# Patient Record
Sex: FEMALE | Race: WHITE | Employment: FULL TIME | URBAN - NONMETROPOLITAN AREA
[De-identification: names, ages, dates, MRNs, and addresses within clinical notes are randomized per-mention and may not be internally consistent; named-entity substitution may affect disease eponyms.]

---

## 2018-01-29 ENCOUNTER — OFFICE VISIT (OUTPATIENT)
Dept: FAMILY MEDICINE CLINIC | Age: 33
End: 2018-01-29
Payer: COMMERCIAL

## 2018-01-29 VITALS
TEMPERATURE: 98 F | HEART RATE: 77 BPM | SYSTOLIC BLOOD PRESSURE: 124 MMHG | RESPIRATION RATE: 20 BRPM | DIASTOLIC BLOOD PRESSURE: 82 MMHG | WEIGHT: 230 LBS | BODY MASS INDEX: 37.12 KG/M2 | OXYGEN SATURATION: 98 %

## 2018-01-29 DIAGNOSIS — J03.90 TONSILLITIS: Primary | ICD-10-CM

## 2018-01-29 DIAGNOSIS — J02.9 SORE THROAT: ICD-10-CM

## 2018-01-29 LAB — S PYO AG THROAT QL: NEGATIVE

## 2018-01-29 PROCEDURE — 99213 OFFICE O/P EST LOW 20 MIN: CPT | Performed by: NURSE PRACTITIONER

## 2018-01-29 RX ORDER — AZITHROMYCIN 250 MG/1
TABLET, FILM COATED ORAL
Qty: 1 PACKET | Refills: 0 | Status: SHIPPED | OUTPATIENT
Start: 2018-01-29

## 2018-01-29 ASSESSMENT — ENCOUNTER SYMPTOMS: SORE THROAT: 1

## 2018-02-01 LAB
ORGANISM: ABNORMAL
S PYO THROAT QL CULT: ABNORMAL
S PYO THROAT QL CULT: ABNORMAL

## 2019-06-15 ENCOUNTER — OFFICE VISIT (OUTPATIENT)
Dept: URGENT CARE | Age: 34
End: 2019-06-15
Payer: COMMERCIAL

## 2019-06-15 VITALS
OXYGEN SATURATION: 97 % | TEMPERATURE: 99.6 F | HEIGHT: 65 IN | DIASTOLIC BLOOD PRESSURE: 84 MMHG | BODY MASS INDEX: 36.74 KG/M2 | SYSTOLIC BLOOD PRESSURE: 120 MMHG | HEART RATE: 93 BPM | WEIGHT: 220.5 LBS | RESPIRATION RATE: 16 BRPM

## 2019-06-15 DIAGNOSIS — R30.0 DYSURIA: ICD-10-CM

## 2019-06-15 DIAGNOSIS — N30.90 CYSTITIS: Primary | ICD-10-CM

## 2019-06-15 PROCEDURE — 99202 OFFICE O/P NEW SF 15 MIN: CPT | Performed by: FAMILY MEDICINE

## 2019-06-15 RX ORDER — CEFDINIR 300 MG/1
300 CAPSULE ORAL 2 TIMES DAILY
Qty: 14 CAPSULE | Refills: 0 | Status: SHIPPED | OUTPATIENT
Start: 2019-06-15 | End: 2019-06-22

## 2019-06-15 NOTE — PATIENT INSTRUCTIONS
Drink lots of water and take the antibiotic until completed. Return for problems. We will call if the culture shows that you need a different antibiotic.

## 2019-06-15 NOTE — PROGRESS NOTES
[address]  [address] [place] [id number]  Dept: [phone]  Loc: [phone]     Ahmed Osler is a 27 y.o. female who presents today for her medical conditions/complaintsas noted below. Ahmed Osler is c/o of Dysuria (completed Amoxicillin 3 weeks ago, now with buring urination)        HPI:     Urinary Tract Infection    The current episode started in the past 7 days. The problem has been gradually worsening. The quality of the pain is described as burning and shooting. Associated symptoms include frequency, hesitancy and urgency. Pertinent negatives include no chills, discharge, flank pain or hematuria. There is no history of catheterization, recurrent UTIs, urinary stasis or a urological procedure. No past medical history on file. Past Surgical History:   Procedure Laterality Date     SECTION      patient has had 2        Family History   Problem Relation Age of Onset    Diabetes Mother         diagnosed with Type 1 but changed lifestyle and doig fine    Diabetes Maternal Grandmother     Heart Disease Maternal Grandmother     Heart Disease Maternal Grandfather        Social History     Tobacco Use    Smoking status: Never Smoker    Smokeless tobacco: Never Used   Substance Use Topics    Alcohol use: No      Current Outpatient Medications   Medication Sig Dispense Refill    cefdinir (OMNICEF) 300 MG capsule Take 1 capsule by mouth 2 times daily for 7 days 14 capsule 0    azithromycin (ZITHROMAX Z-CATHY) 250 MG tablet Take as directed 1 packet 0     No current facility-administered medications for this visit.       Allergies   Allergen Reactions    Amoxicillin      Thrush/yeast infection         Health Maintenance   Topic Date Due    Varicella Vaccine (1 of 2 - 13+ 2-dose series) 1997    HIV screen  1999    DTaP/Tdap/Td vaccine (1 - Tdap) 2003    Cervical cancer screen  2005    Flu vaccine (Season Ended) 09/01/2019    Pneumococcal 0-64 years Vaccine  Aged Out       Subjective:     Review of Systems   Constitutional: Negative for chills. Genitourinary: Positive for frequency, hesitancy and urgency. Negative for flank pain and hematuria. Objective:      Physical Exam   Constitutional: She is oriented to person, place, and time. She appears well-developed and well-nourished. No distress. HENT:   Head: Normocephalic and atraumatic. Eyes: Pupils are equal, round, and reactive to light. EOM are normal.   Cardiovascular: Normal rate and regular rhythm. Pulmonary/Chest: Effort normal and breath sounds normal.   Neurological: She is alert and oriented to person, place, and time. Skin: She is not diaphoretic. Nursing note and vitals reviewed. /84   Pulse 93   Temp 99.6 °F (37.6 °C) (Temporal)   Resp 16   Ht 5' 5\" (1.651 m)   Wt 220 lb 8 oz (100 kg)   SpO2 97%   BMI 36.69 kg/m²     Assessment:          Diagnosis Orders   1. Cystitis  cefdinir (OMNICEF) 300 MG capsule   2. Dysuria  Urine culture       Plan:      Orders Placed This Encounter   Procedures    Urine culture     Order Specific Question:   Specify (ex-cath, midstream, cysto, etc)? Answer:   midstream        No follow-ups on file. Orders Placed This Encounter   Procedures    Urine culture     Order Specific Question:   Specify (ex-cath, midstream, cysto, etc)? Answer:   midstream     Orders Placed This Encounter   Medications    cefdinir (OMNICEF) 300 MG capsule     Sig: Take 1 capsule by mouth 2 times daily for 7 days     Dispense:  14 capsule     Refill:  0       Patient given educationalmaterials - see patient instructions. Discussed use, benefit, and side effectsof prescribed medications. All patient questions answered. Pt voiced understanding. Reviewed health maintenance. Instructed to continue current medications, diet andexercise. Patient agreed with treatment plan.  Follow up as directed. Patient Instructions   Drink lots of water and take the antibiotic until completed. Return for problems. We will call if the culture shows that you need a different antibiotic.         Electronically signed by Taras Gallegos MD on 6/15/2019 at 1:34 PM

## 2019-06-17 LAB
ORGANISM: ABNORMAL
URINE CULTURE, ROUTINE: ABNORMAL
URINE CULTURE, ROUTINE: ABNORMAL

## 2019-06-19 ENCOUNTER — TELEPHONE (OUTPATIENT)
Dept: URGENT CARE | Age: 34
End: 2019-06-19

## 2019-06-19 NOTE — TELEPHONE ENCOUNTER
Please inform patient that her urine culture grew e coli bacteria. She was started on omnicef which is sensitive, there is no further treatment necessary. Please have patient finish full course of antibiotics and follow up with PCP as needed.  Thanks

## 2019-08-15 ENCOUNTER — OFFICE VISIT (OUTPATIENT)
Dept: URGENT CARE | Age: 34
End: 2019-08-15

## 2019-08-15 ENCOUNTER — HOSPITAL ENCOUNTER (EMERGENCY)
Age: 34
Discharge: LEFT BEFORE DISCHARGE | End: 2019-08-15

## 2019-08-15 VITALS
RESPIRATION RATE: 19 BRPM | HEART RATE: 109 BPM | DIASTOLIC BLOOD PRESSURE: 85 MMHG | SYSTOLIC BLOOD PRESSURE: 128 MMHG | OXYGEN SATURATION: 97 % | WEIGHT: 212 LBS | BODY MASS INDEX: 34.07 KG/M2 | TEMPERATURE: 102.3 F | HEIGHT: 66 IN

## 2019-08-15 VITALS
SYSTOLIC BLOOD PRESSURE: 122 MMHG | TEMPERATURE: 100.8 F | BODY MASS INDEX: 34.07 KG/M2 | RESPIRATION RATE: 16 BRPM | OXYGEN SATURATION: 99 % | WEIGHT: 212 LBS | HEART RATE: 118 BPM | HEIGHT: 66 IN | DIASTOLIC BLOOD PRESSURE: 80 MMHG

## 2019-08-15 DIAGNOSIS — R30.0 DYSURIA: Primary | ICD-10-CM

## 2019-08-15 DIAGNOSIS — R10.30 LOWER ABDOMINAL PAIN: ICD-10-CM

## 2019-08-15 DIAGNOSIS — R50.9 FEVER, UNSPECIFIED FEVER CAUSE: ICD-10-CM

## 2019-08-15 LAB
APPEARANCE FLUID: CLEAR
BILIRUBIN, POC: ABNORMAL
BLOOD URINE, POC: ABNORMAL
CLARITY, POC: CLEAR
COLOR, POC: YELLOW
GLUCOSE URINE, POC: ABNORMAL
INFLUENZA A ANTIBODY: NEGATIVE
INFLUENZA B ANTIBODY: NEGATIVE
KETONES, POC: ABNORMAL
LEUKOCYTE EST, POC: ABNORMAL
NITRITE, POC: ABNORMAL
PH, POC: 6
PROTEIN, POC: ABNORMAL
SPECIFIC GRAVITY, POC: 1.02
UROBILINOGEN, POC: 1

## 2019-08-15 PROCEDURE — 4500000002 HC ER NO CHARGE

## 2019-08-15 PROCEDURE — 87804 INFLUENZA ASSAY W/OPTIC: CPT | Performed by: NURSE PRACTITIONER

## 2019-08-15 PROCEDURE — 81002 URINALYSIS NONAUTO W/O SCOPE: CPT | Performed by: NURSE PRACTITIONER

## 2019-08-15 RX ORDER — IBUPROFEN 200 MG
200 TABLET ORAL EVERY 6 HOURS PRN
COMMUNITY

## 2019-08-15 ASSESSMENT — ENCOUNTER SYMPTOMS
EYES NEGATIVE: 1
SORE THROAT: 0
COUGH: 0
SINUS PAIN: 0
ABDOMINAL PAIN: 1
DIARRHEA: 0
BACK PAIN: 1
SHORTNESS OF BREATH: 0
VOMITING: 0
SINUS PRESSURE: 0
NAUSEA: 1
ALLERGIC/IMMUNOLOGIC NEGATIVE: 1

## 2019-08-15 ASSESSMENT — PAIN SCALES - GENERAL: PAINLEVEL_OUTOF10: 8

## 2019-08-15 ASSESSMENT — PAIN DESCRIPTION - LOCATION: LOCATION: GENERALIZED

## 2020-05-20 ENCOUNTER — INITIAL PRENATAL (OUTPATIENT)
Dept: OBSTETRICS AND GYNECOLOGY | Facility: CLINIC | Age: 35
End: 2020-05-20

## 2020-05-20 VITALS
SYSTOLIC BLOOD PRESSURE: 136 MMHG | HEIGHT: 65 IN | WEIGHT: 248 LBS | DIASTOLIC BLOOD PRESSURE: 74 MMHG | BODY MASS INDEX: 41.32 KG/M2

## 2020-05-20 DIAGNOSIS — Z3A.08 8 WEEKS GESTATION OF PREGNANCY: Primary | ICD-10-CM

## 2020-05-20 PROCEDURE — 99202 OFFICE O/P NEW SF 15 MIN: CPT | Performed by: OBSTETRICS & GYNECOLOGY

## 2020-05-20 RX ORDER — PROMETHAZINE HYDROCHLORIDE 12.5 MG/1
12.5 TABLET ORAL EVERY 6 HOURS PRN
Qty: 30 TABLET | Refills: 1 | Status: SHIPPED | OUTPATIENT
Start: 2020-05-20 | End: 2020-06-16

## 2020-05-20 NOTE — PROGRESS NOTES
at 8w5d presents for initial prenatal care visit. Planned pregnancy  OBHx: CSX2  GynHx: no history of abnormal pap smears   PMH: denies   PSh: CSX2  NKDA   PNV   SH: denies drinking, smoking or drug use   PE see above   A/P  at 8w5d IUP   New ob information discussed   RTC in 4 weeks   Miscarriage precautions discussed  Discussed panorama.   No previous blood pressure issues outside of pregnancy   PAP smear next visit

## 2020-05-23 LAB
ABO GROUP BLD: NORMAL
BACTERIA UR CULT: ABNORMAL
BACTERIA UR CULT: ABNORMAL
BASOPHILS # BLD AUTO: 0 X10E3/UL (ref 0–0.2)
BASOPHILS NFR BLD AUTO: 0 %
BLD GP AB SCN SERPL QL: NEGATIVE
C TRACH RRNA SPEC QL NAA+PROBE: NEGATIVE
DRUGS UR: NORMAL
EOSINOPHIL # BLD AUTO: 0.1 X10E3/UL (ref 0–0.4)
EOSINOPHIL NFR BLD AUTO: 1 %
ERYTHROCYTE [DISTWIDTH] IN BLOOD BY AUTOMATED COUNT: 13.5 % (ref 11.7–15.4)
HBV SURFACE AG SERPL QL IA: NEGATIVE
HCT VFR BLD AUTO: 41.1 % (ref 34–46.6)
HGB BLD-MCNC: 13.1 G/DL (ref 11.1–15.9)
HIV 1+2 AB+HIV1 P24 AG SERPL QL IA: NON REACTIVE
IMM GRANULOCYTES # BLD AUTO: 0 X10E3/UL (ref 0–0.1)
IMM GRANULOCYTES NFR BLD AUTO: 0 %
LYMPHOCYTES # BLD AUTO: 1.8 X10E3/UL (ref 0.7–3.1)
LYMPHOCYTES NFR BLD AUTO: 16 %
MCH RBC QN AUTO: 29.4 PG (ref 26.6–33)
MCHC RBC AUTO-ENTMCNC: 31.9 G/DL (ref 31.5–35.7)
MCV RBC AUTO: 92 FL (ref 79–97)
MONOCYTES # BLD AUTO: 1 X10E3/UL (ref 0.1–0.9)
MONOCYTES NFR BLD AUTO: 9 %
N GONORRHOEA RRNA SPEC QL NAA+PROBE: NEGATIVE
NEUTROPHILS # BLD AUTO: 8.6 X10E3/UL (ref 1.4–7)
NEUTROPHILS NFR BLD AUTO: 74 %
OTHER ANTIBIOTIC SUSC ISLT: ABNORMAL
PLATELET # BLD AUTO: 333 X10E3/UL (ref 150–450)
RBC # BLD AUTO: 4.46 X10E6/UL (ref 3.77–5.28)
RH BLD: POSITIVE
RPR SER QL: NON REACTIVE
RUBV IGG SERPL IA-ACNC: 1.4 INDEX
WBC # BLD AUTO: 11.6 X10E3/UL (ref 3.4–10.8)

## 2020-05-26 ENCOUNTER — TELEPHONE (OUTPATIENT)
Dept: OBSTETRICS AND GYNECOLOGY | Facility: CLINIC | Age: 35
End: 2020-05-26

## 2020-05-26 RX ORDER — CEPHALEXIN 500 MG/1
500 CAPSULE ORAL 2 TIMES DAILY
Qty: 10 CAPSULE | Refills: 0 | Status: SHIPPED | OUTPATIENT
Start: 2020-05-26 | End: 2020-05-31

## 2020-05-26 NOTE — TELEPHONE ENCOUNTER
----- Message from Cindy Camacho DO sent at 5/26/2020  9:18 AM CDT -----  Please let the patient know that her urine culture showed e coli. Please send the patient Keflex 500 mg bid for five days

## 2020-05-28 ENCOUNTER — TELEPHONE (OUTPATIENT)
Dept: OBSTETRICS AND GYNECOLOGY | Facility: CLINIC | Age: 35
End: 2020-05-28

## 2020-05-28 NOTE — TELEPHONE ENCOUNTER
Patient can try to take monistat at this time. If she feels like she is having trouble with the antibiotic she should stop it. We are not sending medication to the pharmacy any longer without being evaluated.

## 2020-05-28 NOTE — TELEPHONE ENCOUNTER
Pt calls - she is taking Keflex for e-coli in urine.  She states she has developed raised sores in her mouth since being on Keflex;, having difficulty eating, and also vaginal itching, irritation.  Requesting medication.  Please advise.  Thank you

## 2020-06-16 ENCOUNTER — ROUTINE PRENATAL (OUTPATIENT)
Dept: OBSTETRICS AND GYNECOLOGY | Facility: CLINIC | Age: 35
End: 2020-06-16

## 2020-06-16 VITALS — SYSTOLIC BLOOD PRESSURE: 134 MMHG | WEIGHT: 252 LBS | BODY MASS INDEX: 41.93 KG/M2 | DIASTOLIC BLOOD PRESSURE: 74 MMHG

## 2020-06-16 DIAGNOSIS — Z3A.12 12 WEEKS GESTATION OF PREGNANCY: Primary | ICD-10-CM

## 2020-06-16 DIAGNOSIS — Z12.4 ENCOUNTER FOR SCREENING FOR CERVICAL CANCER: ICD-10-CM

## 2020-06-16 LAB
GLUCOSE UR STRIP-MCNC: NEGATIVE MG/DL
PROT UR STRIP-MCNC: NEGATIVE MG/DL

## 2020-06-16 PROCEDURE — G0123 SCREEN CERV/VAG THIN LAYER: HCPCS | Performed by: OBSTETRICS & GYNECOLOGY

## 2020-06-16 PROCEDURE — 87624 HPV HI-RISK TYP POOLED RSLT: CPT | Performed by: OBSTETRICS & GYNECOLOGY

## 2020-06-16 PROCEDURE — 99212 OFFICE O/P EST SF 10 MIN: CPT | Performed by: OBSTETRICS & GYNECOLOGY

## 2020-06-16 RX ORDER — PROMETHAZINE HYDROCHLORIDE 25 MG/1
25 TABLET ORAL EVERY 6 HOURS PRN
Qty: 30 TABLET | Refills: 2 | Status: SHIPPED | OUTPATIENT
Start: 2020-06-16 | End: 2020-09-21 | Stop reason: SDUPTHER

## 2020-06-16 NOTE — PROGRESS NOTES
at 12.4 IUP presents for follow up prenatal care visit. No obstetrical complaints. Reports the phenergan is helping with her nausea.   PE see above   A/P  at 12.4 IUP   PAP smear collected today. [place] was 5 years ago   RTC in 4 weeks   Panorama today with carrier screening   Miscarriage precautions

## 2020-06-19 LAB
GEN CATEG CVX/VAG CYTO-IMP: NORMAL
HPV I/H RISK 4 DNA CVX QL PROBE+SIG AMP: NOT DETECTED
LAB AP CASE REPORT: NORMAL
LAB AP GYN ADDITIONAL INFORMATION: NORMAL
PATH INTERP SPEC-IMP: NORMAL
STAT OF ADQ CVX/VAG CYTO-IMP: NORMAL

## 2020-07-14 ENCOUNTER — ROUTINE PRENATAL (OUTPATIENT)
Dept: OBSTETRICS AND GYNECOLOGY | Facility: CLINIC | Age: 35
End: 2020-07-14

## 2020-07-14 VITALS — BODY MASS INDEX: 42.6 KG/M2 | WEIGHT: 256 LBS | DIASTOLIC BLOOD PRESSURE: 80 MMHG | SYSTOLIC BLOOD PRESSURE: 126 MMHG

## 2020-07-14 DIAGNOSIS — Z3A.16 16 WEEKS GESTATION OF PREGNANCY: Primary | ICD-10-CM

## 2020-07-14 LAB
GLUCOSE UR STRIP-MCNC: NEGATIVE MG/DL
PROT UR STRIP-MCNC: NEGATIVE MG/DL

## 2020-07-14 PROCEDURE — 99212 OFFICE O/P EST SF 10 MIN: CPT | Performed by: OBSTETRICS & GYNECOLOGY

## 2020-07-14 NOTE — PROGRESS NOTES
at 16.4 IUP presents for follow up prenatal care visit. No obstetrical complaints. Was treated for a UTI by her PCP.   PE see above   A/  at 16.4 IUP   RTC in 4 weeks   Miscarriage precautions discussed   Will resend urine today.

## 2020-07-16 LAB
BACTERIA UR CULT: NORMAL
BACTERIA UR CULT: NORMAL

## 2020-08-12 ENCOUNTER — ROUTINE PRENATAL (OUTPATIENT)
Dept: OBSTETRICS AND GYNECOLOGY | Facility: CLINIC | Age: 35
End: 2020-08-12

## 2020-08-12 VITALS — BODY MASS INDEX: 42.93 KG/M2 | DIASTOLIC BLOOD PRESSURE: 70 MMHG | WEIGHT: 258 LBS | SYSTOLIC BLOOD PRESSURE: 128 MMHG

## 2020-08-12 DIAGNOSIS — Z3A.20 20 WEEKS GESTATION OF PREGNANCY: Primary | ICD-10-CM

## 2020-08-12 LAB
GLUCOSE UR STRIP-MCNC: NEGATIVE MG/DL
PROT UR STRIP-MCNC: NEGATIVE MG/DL

## 2020-08-12 PROCEDURE — 99212 OFFICE O/P EST SF 10 MIN: CPT | Performed by: OBSTETRICS & GYNECOLOGY

## 2020-08-12 NOTE — PROGRESS NOTES
at 20.5 IUP presents for follow up prenatal care visit. No obstetrical complaints.   PE see above   A/P  at 20.5 IUP   RTC In 4 weeks   Desires sterilization Will sign papers around 31 weeks   Miscarriage precautions discussed.

## 2020-09-21 ENCOUNTER — ROUTINE PRENATAL (OUTPATIENT)
Dept: OBSTETRICS AND GYNECOLOGY | Facility: CLINIC | Age: 35
End: 2020-09-21

## 2020-09-21 VITALS — BODY MASS INDEX: 44.43 KG/M2 | WEIGHT: 267 LBS | DIASTOLIC BLOOD PRESSURE: 76 MMHG | SYSTOLIC BLOOD PRESSURE: 110 MMHG

## 2020-09-21 DIAGNOSIS — Z3A.26 26 WEEKS GESTATION OF PREGNANCY: Primary | ICD-10-CM

## 2020-09-21 LAB
GLUCOSE UR STRIP-MCNC: NEGATIVE MG/DL
PROT UR STRIP-MCNC: NEGATIVE MG/DL

## 2020-09-21 PROCEDURE — 99212 OFFICE O/P EST SF 10 MIN: CPT | Performed by: OBSTETRICS & GYNECOLOGY

## 2020-09-21 RX ORDER — PROMETHAZINE HYDROCHLORIDE 25 MG/1
TABLET ORAL
COMMUNITY
Start: 2020-06-16 | End: 2020-10-20

## 2020-09-21 RX ORDER — ASCORBIC ACID, CHOLECALCIFEROL, .ALPHA.-TOCOPHEROL ACETATE, DL-, PYRIDOXINE, FOLIC ACID, CYANOCOBALAMIN, CALCIUM, FERROUS FUMARATE, MAGNESIUM, DOCONEXENT 85; 200; 10; 25; 1; 12; 140; 27; 45; 300 [IU]/1; [IU]/1; [IU]/1; [IU]/1; MG/1; UG/1; MG/1; MG/1; MG/1; MG/1
CAPSULE, GELATIN COATED ORAL
COMMUNITY
End: 2021-01-05

## 2020-09-21 NOTE — PROGRESS NOTES
at 26.3 IUP presents for follow up. No obstetrical complaints.   PE see above   A/P  at 26.3 IUP   RTC in 2 weeks   GCT and hemoglobin at that time   Declined flu shot.   Desires sterilization at time of section.

## 2020-10-06 ENCOUNTER — ROUTINE PRENATAL (OUTPATIENT)
Dept: OBSTETRICS AND GYNECOLOGY | Facility: CLINIC | Age: 35
End: 2020-10-06

## 2020-10-06 VITALS — DIASTOLIC BLOOD PRESSURE: 72 MMHG | SYSTOLIC BLOOD PRESSURE: 128 MMHG | BODY MASS INDEX: 44.93 KG/M2 | WEIGHT: 270 LBS

## 2020-10-06 DIAGNOSIS — Z3A.28 28 WEEKS GESTATION OF PREGNANCY: Primary | ICD-10-CM

## 2020-10-06 LAB
GLUCOSE UR STRIP-MCNC: NEGATIVE MG/DL
PROT UR STRIP-MCNC: NEGATIVE MG/DL

## 2020-10-06 PROCEDURE — 99212 OFFICE O/P EST SF 10 MIN: CPT | Performed by: OBSTETRICS & GYNECOLOGY

## 2020-10-06 NOTE — PROGRESS NOTES
at 28.4 IUP Presents for follow up prenatal care visit. No complaints.   PE see above   A/P  at 28.4 IUP   Desires sterilization. Will sign at next visit   Schedule section for 2020   PTL precautions   GCT and hemoglobin   Declined TDap and flu shot.

## 2020-10-07 LAB
GLUCOSE 1H P 50 G GLC PO SERPL-MCNC: 88 MG/DL (ref 65–139)
HGB BLD-MCNC: 10.8 G/DL (ref 11.1–15.9)

## 2020-10-20 ENCOUNTER — ROUTINE PRENATAL (OUTPATIENT)
Dept: OBSTETRICS AND GYNECOLOGY | Facility: CLINIC | Age: 35
End: 2020-10-20

## 2020-10-20 VITALS — BODY MASS INDEX: 45.26 KG/M2 | WEIGHT: 272 LBS | SYSTOLIC BLOOD PRESSURE: 126 MMHG | DIASTOLIC BLOOD PRESSURE: 84 MMHG

## 2020-10-20 DIAGNOSIS — O34.219 PREVIOUS CESAREAN DELIVERY, ANTEPARTUM: Primary | ICD-10-CM

## 2020-10-20 LAB
GLUCOSE UR STRIP-MCNC: NEGATIVE MG/DL
PROT UR STRIP-MCNC: NEGATIVE MG/DL

## 2020-10-20 PROCEDURE — 99213 OFFICE O/P EST LOW 20 MIN: CPT | Performed by: OBSTETRICS & GYNECOLOGY

## 2020-11-03 ENCOUNTER — ROUTINE PRENATAL (OUTPATIENT)
Dept: OBSTETRICS AND GYNECOLOGY | Facility: CLINIC | Age: 35
End: 2020-11-03

## 2020-11-03 VITALS — DIASTOLIC BLOOD PRESSURE: 76 MMHG | SYSTOLIC BLOOD PRESSURE: 120 MMHG | WEIGHT: 274 LBS | BODY MASS INDEX: 45.6 KG/M2

## 2020-11-03 DIAGNOSIS — Z98.891 S/P CESAREAN SECTION: ICD-10-CM

## 2020-11-03 DIAGNOSIS — Z30.2 ENCOUNTER FOR STERILIZATION: Primary | ICD-10-CM

## 2020-11-03 LAB
GLUCOSE UR STRIP-MCNC: NEGATIVE MG/DL
PROT UR STRIP-MCNC: NEGATIVE MG/DL

## 2020-11-03 PROCEDURE — 99212 OFFICE O/P EST SF 10 MIN: CPT | Performed by: OBSTETRICS & GYNECOLOGY

## 2020-11-03 NOTE — PROGRESS NOTES
at 32.4 IUP Presents for follow up prenatal care visit. NO obstetrical complaints.   PE see above   A/P  at 32.4 IUP   RTC In 2 weeks   Signed papers for tubal ligation   Scheduled repeat  section with salpingectomy, Discussed risk, benefits at this time.   PTL precautions discussed.

## 2020-11-17 ENCOUNTER — ROUTINE PRENATAL (OUTPATIENT)
Dept: OBSTETRICS AND GYNECOLOGY | Facility: CLINIC | Age: 35
End: 2020-11-17

## 2020-11-17 VITALS — SYSTOLIC BLOOD PRESSURE: 124 MMHG | DIASTOLIC BLOOD PRESSURE: 78 MMHG | BODY MASS INDEX: 45.6 KG/M2 | WEIGHT: 274 LBS

## 2020-11-17 DIAGNOSIS — Z98.891 S/P CESAREAN SECTION: Primary | ICD-10-CM

## 2020-11-17 DIAGNOSIS — Z3A.34 34 WEEKS GESTATION OF PREGNANCY: ICD-10-CM

## 2020-11-17 LAB
GLUCOSE UR STRIP-MCNC: NEGATIVE MG/DL
PROT UR STRIP-MCNC: ABNORMAL MG/DL

## 2020-11-17 PROCEDURE — 99212 OFFICE O/P EST SF 10 MIN: CPT | Performed by: OBSTETRICS & GYNECOLOGY

## 2020-11-17 NOTE — PROGRESS NOTES
at 34.4 IUP presents for follow up prenatal care visit. No obstetrical complaints.   PE see above   A/P  at 34.4 IUP   RTC in 2 weeks   PTL precautions   Cultures next visit

## 2020-12-01 ENCOUNTER — ROUTINE PRENATAL (OUTPATIENT)
Dept: OBSTETRICS AND GYNECOLOGY | Facility: CLINIC | Age: 35
End: 2020-12-01

## 2020-12-01 VITALS — WEIGHT: 281 LBS | DIASTOLIC BLOOD PRESSURE: 80 MMHG | SYSTOLIC BLOOD PRESSURE: 120 MMHG | BODY MASS INDEX: 46.76 KG/M2

## 2020-12-01 DIAGNOSIS — Z3A.36 36 WEEKS GESTATION OF PREGNANCY: Primary | ICD-10-CM

## 2020-12-01 LAB
GLUCOSE UR STRIP-MCNC: NEGATIVE MG/DL
PROT UR STRIP-MCNC: NEGATIVE MG/DL

## 2020-12-01 PROCEDURE — 99212 OFFICE O/P EST SF 10 MIN: CPT | Performed by: OBSTETRICS & GYNECOLOGY

## 2020-12-01 NOTE — PROGRESS NOTES
at 36.4 IUP presents for follow up prenatal care visit. No obstetrical complaints.   PE see above   A/p  at 36.4 IUP   RTC in 1 week   PTL precautions   Cultures at next visit

## 2020-12-07 ENCOUNTER — ROUTINE PRENATAL (OUTPATIENT)
Dept: OBSTETRICS AND GYNECOLOGY | Facility: CLINIC | Age: 35
End: 2020-12-07

## 2020-12-07 ENCOUNTER — HOSPITAL ENCOUNTER (OUTPATIENT)
Facility: HOSPITAL | Age: 35
Discharge: HOME | End: 2020-12-07
Attending: OBSTETRICS & GYNECOLOGY | Admitting: OBSTETRICS & GYNECOLOGY

## 2020-12-07 VITALS
SYSTOLIC BLOOD PRESSURE: 138 MMHG | BODY MASS INDEX: 47.65 KG/M2 | DIASTOLIC BLOOD PRESSURE: 80 MMHG | HEIGHT: 65 IN | WEIGHT: 286 LBS

## 2020-12-07 VITALS — DIASTOLIC BLOOD PRESSURE: 80 MMHG | WEIGHT: 284 LBS | BODY MASS INDEX: 47.26 KG/M2 | SYSTOLIC BLOOD PRESSURE: 136 MMHG

## 2020-12-07 DIAGNOSIS — Z98.891 S/P CESAREAN SECTION: Primary | ICD-10-CM

## 2020-12-07 DIAGNOSIS — Z3A.37 37 WEEKS GESTATION OF PREGNANCY: ICD-10-CM

## 2020-12-07 LAB
GLUCOSE UR STRIP-MCNC: NEGATIVE MG/DL
PROT UR STRIP-MCNC: NEGATIVE MG/DL

## 2020-12-07 PROCEDURE — 99212 OFFICE O/P EST SF 10 MIN: CPT | Performed by: OBSTETRICS & GYNECOLOGY

## 2020-12-07 PROCEDURE — G0463 HOSPITAL OUTPT CLINIC VISIT: HCPCS

## 2020-12-07 PROCEDURE — G0378 HOSPITAL OBSERVATION PER HR: HCPCS

## 2020-12-07 NOTE — NURSING NOTE
Patient presents to labor and delivery with c/o contractions that started this morning. Patient reports good fetal movement, no bleeding or leaking of fluid.

## 2020-12-07 NOTE — PROGRESS NOTES
at 37.3 IUP presents for follow up prenatal care visit. Reports that she has had contractions that started yesterday and have progressively gotten closer together. She denies VB or LOF. Reports + FM.   PE   SVE: 1 cm   A/P  at 37.3 IUP   Patient sent to labor and delivery for monitoring

## 2020-12-08 PROBLEM — Z34.90 PREGNANCY: Status: ACTIVE | Noted: 2020-12-08

## 2020-12-08 NOTE — H&P
Patient is sent from my office to rule out labor.  She was found to be 1 cm in the office.  She has been monitored for several hours on labor and delivery.  She is having irregular contractions.  Cervix is unchanged at fingertip.  Category 1 NST with irregular contractions noted on the monitor.  Labor warnings given.  Follow-up as scheduled.    Alfonso Bar MD

## 2020-12-10 LAB
C TRACH RRNA SPEC QL NAA+PROBE: NEGATIVE
GP B STREP DNA SPEC QL NAA+PROBE: POSITIVE
N GONORRHOEA RRNA SPEC QL NAA+PROBE: NEGATIVE

## 2020-12-14 ENCOUNTER — ANESTHESIA EVENT (OUTPATIENT)
Dept: LABOR AND DELIVERY | Facility: HOSPITAL | Age: 35
End: 2020-12-14

## 2020-12-15 ENCOUNTER — PREP FOR SURGERY (OUTPATIENT)
Dept: OTHER | Facility: HOSPITAL | Age: 35
End: 2020-12-15

## 2020-12-15 ENCOUNTER — ROUTINE PRENATAL (OUTPATIENT)
Dept: OBSTETRICS AND GYNECOLOGY | Facility: CLINIC | Age: 35
End: 2020-12-15

## 2020-12-15 VITALS — BODY MASS INDEX: 47.76 KG/M2 | WEIGHT: 287 LBS | DIASTOLIC BLOOD PRESSURE: 76 MMHG | SYSTOLIC BLOOD PRESSURE: 128 MMHG

## 2020-12-15 DIAGNOSIS — Z98.891 S/P CESAREAN SECTION: Primary | ICD-10-CM

## 2020-12-15 LAB
GLUCOSE UR STRIP-MCNC: NEGATIVE MG/DL
PROT UR STRIP-MCNC: NEGATIVE MG/DL

## 2020-12-15 PROCEDURE — 99212 OFFICE O/P EST SF 10 MIN: CPT | Performed by: OBSTETRICS & GYNECOLOGY

## 2020-12-15 RX ORDER — SODIUM CHLORIDE, SODIUM LACTATE, POTASSIUM CHLORIDE, CALCIUM CHLORIDE 600; 310; 30; 20 MG/100ML; MG/100ML; MG/100ML; MG/100ML
125 INJECTION, SOLUTION INTRAVENOUS CONTINUOUS
Status: CANCELLED | OUTPATIENT
Start: 2020-12-15

## 2020-12-15 RX ORDER — OXYTOCIN/0.9 % SODIUM CHLORIDE 30/500 ML
999 PLASTIC BAG, INJECTION (ML) INTRAVENOUS ONCE
Status: CANCELLED | OUTPATIENT
Start: 2020-12-15 | End: 2020-12-15

## 2020-12-15 RX ORDER — CARBOPROST TROMETHAMINE 250 UG/ML
250 INJECTION, SOLUTION INTRAMUSCULAR AS NEEDED
Status: CANCELLED | OUTPATIENT
Start: 2020-12-15

## 2020-12-15 RX ORDER — MISOPROSTOL 200 UG/1
800 TABLET ORAL AS NEEDED
Status: CANCELLED | OUTPATIENT
Start: 2020-12-15

## 2020-12-15 RX ORDER — METHYLERGONOVINE MALEATE 0.2 MG/ML
200 INJECTION INTRAVENOUS ONCE AS NEEDED
Status: CANCELLED | OUTPATIENT
Start: 2020-12-15

## 2020-12-15 RX ORDER — SODIUM CHLORIDE 0.9 % (FLUSH) 0.9 %
3-10 SYRINGE (ML) INJECTION AS NEEDED
Status: CANCELLED | OUTPATIENT
Start: 2020-12-15

## 2020-12-15 RX ORDER — OXYTOCIN/0.9 % SODIUM CHLORIDE 30/500 ML
125 PLASTIC BAG, INJECTION (ML) INTRAVENOUS CONTINUOUS PRN
Status: CANCELLED | OUTPATIENT
Start: 2020-12-15

## 2020-12-15 RX ORDER — LIDOCAINE HYDROCHLORIDE 10 MG/ML
5 INJECTION, SOLUTION EPIDURAL; INFILTRATION; INTRACAUDAL; PERINEURAL AS NEEDED
Status: CANCELLED | OUTPATIENT
Start: 2020-12-15

## 2020-12-15 RX ORDER — SODIUM CHLORIDE 0.9 % (FLUSH) 0.9 %
3 SYRINGE (ML) INJECTION EVERY 12 HOURS SCHEDULED
Status: CANCELLED | OUTPATIENT
Start: 2020-12-15

## 2020-12-15 RX ORDER — CEFAZOLIN SODIUM IN 0.9 % NACL 3 G/100 ML
3 INTRAVENOUS SOLUTION, PIGGYBACK (ML) INTRAVENOUS ONCE
Status: CANCELLED | OUTPATIENT
Start: 2020-12-15 | End: 2020-12-15

## 2020-12-15 RX ORDER — OXYTOCIN/0.9 % SODIUM CHLORIDE 30/500 ML
250 PLASTIC BAG, INJECTION (ML) INTRAVENOUS CONTINUOUS
Status: CANCELLED | OUTPATIENT
Start: 2020-12-15 | End: 2020-12-15

## 2020-12-15 NOTE — H&P (VIEW-ONLY)
[facility]  Reva Hamm  : 1985  MRN: [id number]  CSN: [id number]    History and Physical    Subjective   Reva Hamm is a 35 y.o. year old  with an Estimated Date of Delivery: 20 scheduled on 2020 for  delivery due to previous  section X 2, not a  candidate.  She is planning for sterilization at the time of the .    Prenatal care has been with Dr. Renata Morrow.  It has been benign.    OB History    Para Term  AB Living   3 2 2 0 0 2   SAB TAB Ectopic Molar Multiple Live Births   0 0 0 0 0 2      # Outcome Date GA Lbr Arthur/2nd Weight Sex Delivery Anes PTL Lv   3 Current            2 Term 14   4309 g (9 lb 8 oz) F CS-LTranv   BISHOP   1 Term 09   4082 g (9 lb) M CS-LTranv   BISHOP     No past medical history on file.  Past Surgical History:   Procedure Laterality Date   •  SECTION         Current Outpatient Medications:   •  Prenat w/o F-RB-Rexhjae-FA-DHA (PNV-DHA) 27-0.6-0.4-300 MG capsule, Take  by mouth., Disp: , Rfl:   No family history on file.    Allergies   Allergen Reactions   • Amoxicillin Other (See Comments)     Thrush/yeast infection     Social History    Tobacco Use      Smoking status: Never Smoker      Smokeless tobacco: Never Used    Review of Systems      Objective   LMP 03/15/2020   General: well developed; well nourished  no acute distress   Heart: Not performed.   Lungs: breathing is unlabored   Abdomen: soft, non-tender; no masses  no umbilical or inguinal hernias are present  no hepato-splenomegaly     Cervix:   Not checked  Prenatal Labs  Lab Results   Component Value Date    HGB 10.8 (L) 10/06/2020    HEPBSAG Negative 2020    ABORH A Rh Positive 11/10/2014    ABO A 2020    RH Positive 2020    ABSCRN Negative 2020    [id number] Non Reactive 2020    URINECX Final report 2020       Recent Labs  Lab Results   Component Value Date    HGB 10.8 (L) 10/06/2020    HCT 41.1  2020    WBC 11.6 (H) 2020     2020           Assessment   1. IUP with an Estimated Date of Delivery: 20  2. Planned  section on 2020 for previous  section X 2, not a  candidate.     Plan   1. Repeat  with sterilization    Cindy Camacho, DO  12/15/2020

## 2020-12-15 NOTE — H&P
[facility]  Reva Hamm  : 1985  MRN: [id number]  CSN: [id number]    History and Physical    Subjective   eRva Hamm is a 35 y.o. year old  with an Estimated Date of Delivery: 20 scheduled on 2020 for  delivery due to previous  section X 2, not a  candidate.  She is planning for sterilization at the time of the .    Prenatal care has been with Dr. Renata Morrow.  It has been benign.    OB History    Para Term  AB Living   3 2 2 0 0 2   SAB TAB Ectopic Molar Multiple Live Births   0 0 0 0 0 2      # Outcome Date GA Lbr Arthur/2nd Weight Sex Delivery Anes PTL Lv   3 Current            2 Term 14   4309 g (9 lb 8 oz) F CS-LTranv   BISHOP   1 Term 09   4082 g (9 lb) M CS-LTranv   BISHOP     No past medical history on file.  Past Surgical History:   Procedure Laterality Date   •  SECTION         Current Outpatient Medications:   •  Prenat w/o V-DL-Gvecngd-FA-DHA (PNV-DHA) 27-0.6-0.4-300 MG capsule, Take  by mouth., Disp: , Rfl:   No family history on file.    Allergies   Allergen Reactions   • Amoxicillin Other (See Comments)     Thrush/yeast infection     Social History    Tobacco Use      Smoking status: Never Smoker      Smokeless tobacco: Never Used    Review of Systems      Objective   LMP 03/15/2020   General: well developed; well nourished  no acute distress   Heart: Not performed.   Lungs: breathing is unlabored   Abdomen: soft, non-tender; no masses  no umbilical or inguinal hernias are present  no hepato-splenomegaly     Cervix:   Not checked  Prenatal Labs  Lab Results   Component Value Date    HGB 10.8 (L) 10/06/2020    HEPBSAG Negative 2020    ABORH A Rh Positive 11/10/2014    ABO A 2020    RH Positive 2020    ABSCRN Negative 2020    [id number] Non Reactive 2020    URINECX Final report 2020       Recent Labs  Lab Results   Component Value Date    HGB 10.8 (L) 10/06/2020    HCT 41.1  2020    WBC 11.6 (H) 2020     2020           Assessment   1. IUP with an Estimated Date of Delivery: 20  2. Planned  section on 2020 for previous  section X 2, not a  candidate.     Plan   1. Repeat  with sterilization    Cindy Camacho, DO  12/15/2020

## 2020-12-15 NOTE — PROGRESS NOTES
at 38.4 IUP presents for follow up prenatal care visit. No obstetrical complaints.   PE see above  A/P  at 38.4 IUP   Labor precautions   Scheduled  section on Friday

## 2020-12-18 ENCOUNTER — HOSPITAL ENCOUNTER (INPATIENT)
Facility: HOSPITAL | Age: 35
LOS: 2 days | Discharge: HOME | End: 2020-12-20
Attending: OBSTETRICS & GYNECOLOGY | Admitting: OBSTETRICS & GYNECOLOGY

## 2020-12-18 ENCOUNTER — ANESTHESIA (OUTPATIENT)
Dept: LABOR AND DELIVERY | Facility: HOSPITAL | Age: 35
End: 2020-12-18

## 2020-12-18 DIAGNOSIS — Z98.891 S/P CESAREAN SECTION: ICD-10-CM

## 2020-12-18 DIAGNOSIS — Z30.2 ENCOUNTER FOR STERILIZATION: ICD-10-CM

## 2020-12-18 LAB
ABO GROUP BLD: NORMAL
BLD GP AB SCN SERPL QL: NEGATIVE
DEPRECATED RDW RBC AUTO: 44.5 FL (ref 37–54)
ERYTHROCYTE [DISTWIDTH] IN BLOOD BY AUTOMATED COUNT: 15 % (ref 12.3–15.4)
HCT VFR BLD AUTO: 31.8 % (ref 34–46.6)
HGB BLD-MCNC: 10.8 G/DL (ref 12–15.9)
MCH RBC QN AUTO: 28.1 PG (ref 26.6–33)
MCHC RBC AUTO-ENTMCNC: 34 G/DL (ref 31.5–35.7)
MCV RBC AUTO: 82.8 FL (ref 79–97)
PLATELET # BLD AUTO: 335 10*3/MM3 (ref 140–450)
PMV BLD AUTO: 10.3 FL (ref 6–12)
RBC # BLD AUTO: 3.84 10*6/MM3 (ref 3.77–5.28)
RH BLD: POSITIVE
T&S EXPIRATION DATE: NORMAL
WBC # BLD AUTO: 13.27 10*3/MM3 (ref 3.4–10.8)

## 2020-12-18 PROCEDURE — 0UB70ZZ EXCISION OF BILATERAL FALLOPIAN TUBES, OPEN APPROACH: ICD-10-PCS | Performed by: OBSTETRICS & GYNECOLOGY

## 2020-12-18 PROCEDURE — 85027 COMPLETE CBC AUTOMATED: CPT | Performed by: OBSTETRICS & GYNECOLOGY

## 2020-12-18 PROCEDURE — 58611 LIGATE OVIDUCT(S) ADD-ON: CPT | Performed by: OBSTETRICS & GYNECOLOGY

## 2020-12-18 PROCEDURE — 88305 TISSUE EXAM BY PATHOLOGIST: CPT | Performed by: OBSTETRICS & GYNECOLOGY

## 2020-12-18 PROCEDURE — 94799 UNLISTED PULMONARY SVC/PX: CPT

## 2020-12-18 PROCEDURE — 25010000002 ONDANSETRON PER 1 MG: Performed by: NURSE ANESTHETIST, CERTIFIED REGISTERED

## 2020-12-18 PROCEDURE — 86901 BLOOD TYPING SEROLOGIC RH(D): CPT | Performed by: OBSTETRICS & GYNECOLOGY

## 2020-12-18 PROCEDURE — 25010000002 KETOROLAC TROMETHAMINE PER 15 MG: Performed by: NURSE ANESTHETIST, CERTIFIED REGISTERED

## 2020-12-18 PROCEDURE — 25010000002 HYDROMORPHONE 1 MG/ML SOLUTION: Performed by: NURSE ANESTHETIST, CERTIFIED REGISTERED

## 2020-12-18 PROCEDURE — 86850 RBC ANTIBODY SCREEN: CPT | Performed by: OBSTETRICS & GYNECOLOGY

## 2020-12-18 PROCEDURE — 86900 BLOOD TYPING SEROLOGIC ABO: CPT | Performed by: OBSTETRICS & GYNECOLOGY

## 2020-12-18 PROCEDURE — 25010000002 PHENYLEPHRINE HCL 0.8 MG/10ML SOLUTION PREFILLED SYRINGE: Performed by: NURSE ANESTHETIST, CERTIFIED REGISTERED

## 2020-12-18 PROCEDURE — 88307 TISSUE EXAM BY PATHOLOGIST: CPT | Performed by: OBSTETRICS & GYNECOLOGY

## 2020-12-18 PROCEDURE — 25010000002 KETOROLAC TROMETHAMINE PER 15 MG: Performed by: OBSTETRICS & GYNECOLOGY

## 2020-12-18 PROCEDURE — 63710000001 PROMETHAZINE PER 25 MG: Performed by: OBSTETRICS & GYNECOLOGY

## 2020-12-18 PROCEDURE — 59514 CESAREAN DELIVERY ONLY: CPT | Performed by: OBSTETRICS & GYNECOLOGY

## 2020-12-18 DEVICE — SEAL HEMO SURG ARISTA/AH ABS/PWDR 3GM: Type: IMPLANTABLE DEVICE | Status: FUNCTIONAL

## 2020-12-18 RX ORDER — ONDANSETRON 4 MG/1
4 TABLET, FILM COATED ORAL EVERY 8 HOURS PRN
Status: DISCONTINUED | OUTPATIENT
Start: 2020-12-18 | End: 2020-12-20 | Stop reason: HOSPADM

## 2020-12-18 RX ORDER — ONDANSETRON 2 MG/ML
4 INJECTION INTRAMUSCULAR; INTRAVENOUS ONCE AS NEEDED
Status: DISCONTINUED | OUTPATIENT
Start: 2020-12-18 | End: 2020-12-18

## 2020-12-18 RX ORDER — CEFAZOLIN SODIUM IN 0.9 % NACL 3 G/100 ML
3 INTRAVENOUS SOLUTION, PIGGYBACK (ML) INTRAVENOUS ONCE
Status: COMPLETED | OUTPATIENT
Start: 2020-12-18 | End: 2020-12-18

## 2020-12-18 RX ORDER — DIPHENHYDRAMINE HCL 25 MG
25 CAPSULE ORAL EVERY 4 HOURS PRN
Status: DISCONTINUED | OUTPATIENT
Start: 2020-12-18 | End: 2020-12-20 | Stop reason: HOSPADM

## 2020-12-18 RX ORDER — LIDOCAINE HYDROCHLORIDE 10 MG/ML
5 INJECTION, SOLUTION EPIDURAL; INFILTRATION; INTRACAUDAL; PERINEURAL AS NEEDED
Status: DISCONTINUED | OUTPATIENT
Start: 2020-12-18 | End: 2020-12-18 | Stop reason: HOSPADM

## 2020-12-18 RX ORDER — OXYTOCIN/0.9 % SODIUM CHLORIDE 30/500 ML
999 PLASTIC BAG, INJECTION (ML) INTRAVENOUS ONCE
Status: DISCONTINUED | OUTPATIENT
Start: 2020-12-18 | End: 2020-12-20 | Stop reason: HOSPADM

## 2020-12-18 RX ORDER — PRENATAL VIT/IRON FUM/FOLIC AC 27MG-0.8MG
1 TABLET ORAL DAILY
Status: DISCONTINUED | OUTPATIENT
Start: 2020-12-18 | End: 2020-12-20 | Stop reason: HOSPADM

## 2020-12-18 RX ORDER — SODIUM CHLORIDE, SODIUM LACTATE, POTASSIUM CHLORIDE, CALCIUM CHLORIDE 600; 310; 30; 20 MG/100ML; MG/100ML; MG/100ML; MG/100ML
125 INJECTION, SOLUTION INTRAVENOUS CONTINUOUS
Status: DISCONTINUED | OUTPATIENT
Start: 2020-12-18 | End: 2020-12-18

## 2020-12-18 RX ORDER — SODIUM CHLORIDE 0.9 % (FLUSH) 0.9 %
3 SYRINGE (ML) INJECTION EVERY 12 HOURS SCHEDULED
Status: DISCONTINUED | OUTPATIENT
Start: 2020-12-18 | End: 2020-12-18 | Stop reason: HOSPADM

## 2020-12-18 RX ORDER — CARBOPROST TROMETHAMINE 250 UG/ML
250 INJECTION, SOLUTION INTRAMUSCULAR ONCE
Status: DISCONTINUED | OUTPATIENT
Start: 2020-12-18 | End: 2020-12-20 | Stop reason: HOSPADM

## 2020-12-18 RX ORDER — KETOROLAC TROMETHAMINE 30 MG/ML
30 INJECTION, SOLUTION INTRAMUSCULAR; INTRAVENOUS EVERY 6 HOURS PRN
Status: DISPENSED | OUTPATIENT
Start: 2020-12-18 | End: 2020-12-19

## 2020-12-18 RX ORDER — PROMETHAZINE HYDROCHLORIDE 25 MG/1
12.5 TABLET ORAL EVERY 4 HOURS PRN
Status: DISCONTINUED | OUTPATIENT
Start: 2020-12-18 | End: 2020-12-20 | Stop reason: HOSPADM

## 2020-12-18 RX ORDER — OXYCODONE AND ACETAMINOPHEN 7.5; 325 MG/1; MG/1
2 TABLET ORAL EVERY 4 HOURS PRN
Status: ACTIVE | OUTPATIENT
Start: 2020-12-18 | End: 2020-12-19

## 2020-12-18 RX ORDER — ONDANSETRON 2 MG/ML
4 INJECTION INTRAMUSCULAR; INTRAVENOUS ONCE AS NEEDED
Status: ACTIVE | OUTPATIENT
Start: 2020-12-18 | End: 2020-12-19

## 2020-12-18 RX ORDER — TRISODIUM CITRATE DIHYDRATE AND CITRIC ACID MONOHYDRATE 500; 334 MG/5ML; MG/5ML
15 SOLUTION ORAL ONCE
Status: COMPLETED | OUTPATIENT
Start: 2020-12-18 | End: 2020-12-18

## 2020-12-18 RX ORDER — SODIUM CHLORIDE, SODIUM LACTATE, POTASSIUM CHLORIDE, CALCIUM CHLORIDE 600; 310; 30; 20 MG/100ML; MG/100ML; MG/100ML; MG/100ML
125 INJECTION, SOLUTION INTRAVENOUS CONTINUOUS
Status: CANCELLED | OUTPATIENT
Start: 2020-12-18

## 2020-12-18 RX ORDER — HYDROCODONE BITARTRATE AND ACETAMINOPHEN 5; 325 MG/1; MG/1
1 TABLET ORAL EVERY 4 HOURS PRN
Status: DISCONTINUED | OUTPATIENT
Start: 2020-12-18 | End: 2020-12-18

## 2020-12-18 RX ORDER — OXYTOCIN/0.9 % SODIUM CHLORIDE 30/500 ML
PLASTIC BAG, INJECTION (ML) INTRAVENOUS CONTINUOUS PRN
Status: DISCONTINUED | OUTPATIENT
Start: 2020-12-18 | End: 2020-12-18 | Stop reason: SURG

## 2020-12-18 RX ORDER — HYDROCODONE BITARTRATE AND ACETAMINOPHEN 5; 325 MG/1; MG/1
1 TABLET ORAL EVERY 4 HOURS PRN
Status: DISPENSED | OUTPATIENT
Start: 2020-12-18 | End: 2020-12-19

## 2020-12-18 RX ORDER — KETOROLAC TROMETHAMINE 30 MG/ML
30 INJECTION, SOLUTION INTRAMUSCULAR; INTRAVENOUS EVERY 6 HOURS
Status: DISCONTINUED | OUTPATIENT
Start: 2020-12-18 | End: 2020-12-18

## 2020-12-18 RX ORDER — NALBUPHINE HCL 10 MG/ML
2.5 AMPUL (ML) INJECTION EVERY 4 HOURS PRN
Status: ACTIVE | OUTPATIENT
Start: 2020-12-18 | End: 2020-12-19

## 2020-12-18 RX ORDER — MISOPROSTOL 200 UG/1
800 TABLET ORAL AS NEEDED
Status: DISCONTINUED | OUTPATIENT
Start: 2020-12-18 | End: 2020-12-18 | Stop reason: HOSPADM

## 2020-12-18 RX ORDER — CALCIUM CARBONATE 200(500)MG
1 TABLET,CHEWABLE ORAL EVERY 6 HOURS PRN
Status: DISCONTINUED | OUTPATIENT
Start: 2020-12-18 | End: 2020-12-20 | Stop reason: HOSPADM

## 2020-12-18 RX ORDER — BUPIVACAINE HYDROCHLORIDE 7.5 MG/ML
INJECTION, SOLUTION INTRASPINAL AS NEEDED
Status: DISCONTINUED | OUTPATIENT
Start: 2020-12-18 | End: 2020-12-18 | Stop reason: SURG

## 2020-12-18 RX ORDER — PHENYLEPHRINE HCL IN 0.9% NACL 0.8MG/10ML
SYRINGE (ML) INTRAVENOUS AS NEEDED
Status: DISCONTINUED | OUTPATIENT
Start: 2020-12-18 | End: 2020-12-18 | Stop reason: SURG

## 2020-12-18 RX ORDER — KETOROLAC TROMETHAMINE 30 MG/ML
30 INJECTION, SOLUTION INTRAMUSCULAR; INTRAVENOUS EVERY 6 HOURS
Status: DISPENSED | OUTPATIENT
Start: 2020-12-18 | End: 2020-12-19

## 2020-12-18 RX ORDER — OXYTOCIN/0.9 % SODIUM CHLORIDE 30/500 ML
125 PLASTIC BAG, INJECTION (ML) INTRAVENOUS CONTINUOUS PRN
Status: DISCONTINUED | OUTPATIENT
Start: 2020-12-18 | End: 2020-12-20 | Stop reason: HOSPADM

## 2020-12-18 RX ORDER — SIMETHICONE 80 MG
80 TABLET,CHEWABLE ORAL 4 TIMES DAILY PRN
Status: DISCONTINUED | OUTPATIENT
Start: 2020-12-18 | End: 2020-12-20 | Stop reason: HOSPADM

## 2020-12-18 RX ORDER — SODIUM CHLORIDE, SODIUM LACTATE, POTASSIUM CHLORIDE, CALCIUM CHLORIDE 600; 310; 30; 20 MG/100ML; MG/100ML; MG/100ML; MG/100ML
125 INJECTION, SOLUTION INTRAVENOUS CONTINUOUS
Status: DISCONTINUED | OUTPATIENT
Start: 2020-12-18 | End: 2020-12-20 | Stop reason: HOSPADM

## 2020-12-18 RX ORDER — MISOPROSTOL 200 UG/1
600 TABLET ORAL ONCE
Status: DISCONTINUED | OUTPATIENT
Start: 2020-12-18 | End: 2020-12-20 | Stop reason: HOSPADM

## 2020-12-18 RX ORDER — IBUPROFEN 800 MG/1
800 TABLET ORAL EVERY 8 HOURS PRN
Status: DISCONTINUED | OUTPATIENT
Start: 2020-12-18 | End: 2020-12-20 | Stop reason: HOSPADM

## 2020-12-18 RX ORDER — ACETAMINOPHEN 325 MG/1
650 TABLET ORAL EVERY 4 HOURS PRN
Status: DISCONTINUED | OUTPATIENT
Start: 2020-12-18 | End: 2020-12-18

## 2020-12-18 RX ORDER — FAMOTIDINE 10 MG/ML
20 INJECTION, SOLUTION INTRAVENOUS ONCE AS NEEDED
Status: COMPLETED | OUTPATIENT
Start: 2020-12-18 | End: 2020-12-18

## 2020-12-18 RX ORDER — SODIUM CHLORIDE 0.9 % (FLUSH) 0.9 %
3-10 SYRINGE (ML) INJECTION AS NEEDED
Status: DISCONTINUED | OUTPATIENT
Start: 2020-12-18 | End: 2020-12-18 | Stop reason: HOSPADM

## 2020-12-18 RX ORDER — OXYTOCIN/0.9 % SODIUM CHLORIDE 30/500 ML
250 PLASTIC BAG, INJECTION (ML) INTRAVENOUS CONTINUOUS
Status: ACTIVE | OUTPATIENT
Start: 2020-12-18 | End: 2020-12-18

## 2020-12-18 RX ORDER — OXYCODONE AND ACETAMINOPHEN 7.5; 325 MG/1; MG/1
2 TABLET ORAL EVERY 4 HOURS PRN
Status: DISCONTINUED | OUTPATIENT
Start: 2020-12-18 | End: 2020-12-18

## 2020-12-18 RX ORDER — EPHEDRINE SULFATE 50 MG/ML
INJECTION, SOLUTION INTRAVENOUS AS NEEDED
Status: DISCONTINUED | OUTPATIENT
Start: 2020-12-18 | End: 2020-12-18 | Stop reason: SURG

## 2020-12-18 RX ORDER — ACETAMINOPHEN 325 MG/1
650 TABLET ORAL EVERY 4 HOURS PRN
Status: ACTIVE | OUTPATIENT
Start: 2020-12-18 | End: 2020-12-19

## 2020-12-18 RX ORDER — IBUPROFEN 200 MG
400 TABLET ORAL EVERY 6 HOURS PRN
Status: DISCONTINUED | OUTPATIENT
Start: 2020-12-18 | End: 2020-12-18

## 2020-12-18 RX ORDER — METHYLERGONOVINE MALEATE 0.2 MG/ML
200 INJECTION INTRAVENOUS ONCE AS NEEDED
Status: DISCONTINUED | OUTPATIENT
Start: 2020-12-18 | End: 2020-12-18 | Stop reason: HOSPADM

## 2020-12-18 RX ORDER — DOCUSATE SODIUM 100 MG/1
100 CAPSULE, LIQUID FILLED ORAL 2 TIMES DAILY PRN
Status: DISCONTINUED | OUTPATIENT
Start: 2020-12-18 | End: 2020-12-20 | Stop reason: HOSPADM

## 2020-12-18 RX ORDER — CARBOPROST TROMETHAMINE 250 UG/ML
250 INJECTION, SOLUTION INTRAMUSCULAR AS NEEDED
Status: DISCONTINUED | OUTPATIENT
Start: 2020-12-18 | End: 2020-12-18 | Stop reason: HOSPADM

## 2020-12-18 RX ORDER — NALBUPHINE HCL 10 MG/ML
2.5 AMPUL (ML) INJECTION EVERY 4 HOURS PRN
Status: DISCONTINUED | OUTPATIENT
Start: 2020-12-18 | End: 2020-12-18

## 2020-12-18 RX ORDER — ONDANSETRON 2 MG/ML
INJECTION INTRAMUSCULAR; INTRAVENOUS AS NEEDED
Status: DISCONTINUED | OUTPATIENT
Start: 2020-12-18 | End: 2020-12-18 | Stop reason: SURG

## 2020-12-18 RX ADMIN — Medication 200 MCG: at 08:09

## 2020-12-18 RX ADMIN — SODIUM CHLORIDE, PRESERVATIVE FREE 10 ML: 5 INJECTION INTRAVENOUS at 07:40

## 2020-12-18 RX ADMIN — HYDROMORPHONE HYDROCHLORIDE 100 MCG: 1 INJECTION, SOLUTION INTRAMUSCULAR; INTRAVENOUS; SUBCUTANEOUS at 08:00

## 2020-12-18 RX ADMIN — Medication 200 MCG: at 08:05

## 2020-12-18 RX ADMIN — PROMETHAZINE HYDROCHLORIDE 12.5 MG: 25 TABLET ORAL at 11:45

## 2020-12-18 RX ADMIN — Medication 200 MCG: at 08:07

## 2020-12-18 RX ADMIN — Medication 200 MCG: at 08:11

## 2020-12-18 RX ADMIN — SODIUM CITRATE AND CITRIC ACID MONOHYDRATE 15 ML: 500; 334 SOLUTION ORAL at 07:36

## 2020-12-18 RX ADMIN — SODIUM CHLORIDE, POTASSIUM CHLORIDE, SODIUM LACTATE AND CALCIUM CHLORIDE 125 ML/HR: 600; 310; 30; 20 INJECTION, SOLUTION INTRAVENOUS at 06:00

## 2020-12-18 RX ADMIN — CEFAZOLIN 3 G: 1 INJECTION, POWDER, FOR SOLUTION INTRAMUSCULAR; INTRAVENOUS; PARENTERAL at 08:02

## 2020-12-18 RX ADMIN — SODIUM CHLORIDE, POTASSIUM CHLORIDE, SODIUM LACTATE AND CALCIUM CHLORIDE 125 ML/HR: 600; 310; 30; 20 INJECTION, SOLUTION INTRAVENOUS at 12:06

## 2020-12-18 RX ADMIN — BUPIVACAINE HYDROCHLORIDE IN DEXTROSE 1.6 ML: 7.5 INJECTION, SOLUTION SUBARACHNOID at 08:00

## 2020-12-18 RX ADMIN — KETOROLAC TROMETHAMINE 30 MG: 30 INJECTION, SOLUTION INTRAMUSCULAR; INTRAVENOUS at 18:23

## 2020-12-18 RX ADMIN — SODIUM CHLORIDE, POTASSIUM CHLORIDE, SODIUM LACTATE AND CALCIUM CHLORIDE: 600; 310; 30; 20 INJECTION, SOLUTION INTRAVENOUS at 09:47

## 2020-12-18 RX ADMIN — EPHEDRINE SULFATE 20 MG: 50 INJECTION INTRAVENOUS at 08:15

## 2020-12-18 RX ADMIN — SODIUM CHLORIDE, POTASSIUM CHLORIDE, SODIUM LACTATE AND CALCIUM CHLORIDE: 600; 310; 30; 20 INJECTION, SOLUTION INTRAVENOUS at 08:30

## 2020-12-18 RX ADMIN — KETOROLAC TROMETHAMINE 30 MG: 30 INJECTION, SOLUTION INTRAMUSCULAR; INTRAVENOUS at 11:46

## 2020-12-18 RX ADMIN — ONDANSETRON HYDROCHLORIDE 4 MG: 2 SOLUTION INTRAMUSCULAR; INTRAVENOUS at 07:49

## 2020-12-18 RX ADMIN — FAMOTIDINE 20 MG: 10 INJECTION INTRAVENOUS at 07:36

## 2020-12-18 RX ADMIN — OXYTOCIN-SODIUM CHLORIDE 0.9% IV SOLN 30 UNIT/500ML 500 ML/HR: 30-0.9/5 SOLUTION at 08:30

## 2020-12-18 NOTE — PAYOR COMM NOTE
"REF:  [id number]    [facility]  JERAMIE,  [phone]  OR  FAX   [phone]    Reva Jhaveri (35 y.o. Female)     Date of Birth Social Security Number Address Home Phone MRN    1985  [facility] [facility] [address] [phone] [id number]    [employer] Marital Status          [employer]        Admission Date Admission Type Admitting Provider Attending Provider Department, Room/Bed    20 Elective Cindy Camacho DO Williamson, Katherine, DO [facility] LABOR DELIVERY, LPA3    Discharge Date Discharge Disposition Discharge Destination                       Attending Provider: Cindy Camacho DO    Allergies: Amoxicillin    Isolation: None   Infection: None   Code Status: CPR    Ht: 165.1 cm (65\")   Wt: 132 kg (290 lb 4.8 oz)    Admission Cmt: None   Principal Problem: None                Active Insurance as of 2020     Primary Coverage     Payor Plan Insurance Group Employer/Plan Group    [employer]HE[employer] MEDICAID ANTH MEDICAID [id number]     Payor Plan Address Payor Plan Phone Number Payor Plan Fax Number Effective Dates    [address] [address] [phone]  2019 - None Entered    [facility] [address]       Subscriber Name Subscriber Birth Date Member ID       JHAVERIREVA CM 1985 [id number]                 Emergency Contacts      (Rel.) Home Phone Work Phone Mobile Phone    iGlbert Claudio (Spouse) [phone] -- [phone]        EDC 2020   G-3 P-2   39 WEEKS GESTATIONAL AGE    2020 AT 8:25 AM REPEAT    MALE    3204 GRAMS IN WEIGHT     APGAR 8/9     History & Physical      Cindy Camacho DO at 20 0742        H&P reviewed. The patient was examined and there are no changes to the H&P. Stressed permanence of procedure with salpingectomy.     Electronically signed by Cindy Camacho DO at 20 0743   Source Note           Isa CM " Hansel  : 1985  MRN: [id number]  CSN: [id number]    History and Physical    Subjective   Reva Hamm is a 35 y.o. year old  with an Estimated Date of Delivery: 20 scheduled on 2020 for  delivery due to previous  section X 2, not a  candidate.  She is planning for sterilization at the time of the .    Prenatal care has been with Dr. Renata Morrow.  It has been benign.    OB History    Para Term  AB Living   3 2 2 0 0 2   SAB TAB Ectopic Molar Multiple Live Births   0 0 0 0 0 2      # Outcome Date GA Lbr Arthur/2nd Weight Sex Delivery Anes PTL Lv   3 Current            2 Term 14   4309 g (9 lb 8 oz) F CS-LTranv   BISHOP   1 Term 09   4082 g (9 lb) M CS-LTranv   BISHOP     No past medical history on file.  Past Surgical History:   Procedure Laterality Date   •  SECTION         Current Outpatient Medications:   •  Prenat w/o V-JQ-Xlrrvur-FA-DHA (PNV-DHA) 27-0.6-0.4-300 MG capsule, Take  by mouth., Disp: , Rfl:   No family history on file.    Allergies   Allergen Reactions   • Amoxicillin Other (See Comments)     Thrush/yeast infection     Social History    Tobacco Use      Smoking status: Never Smoker      Smokeless tobacco: Never Used    Review of Systems      Objective   LMP 03/15/2020   General: well developed; well nourished  no acute distress   Heart: Not performed.   Lungs: breathing is unlabored   Abdomen: soft, non-tender; no masses  no umbilical or inguinal hernias are present  no hepato-splenomegaly     Cervix:   Not checked  Prenatal Labs  Lab Results   Component Value Date    HGB 10.8 (L) 10/06/2020    HEPBSAG Negative 2020    ABORH A Rh Positive 11/10/2014    ABO A 2020    RH Positive 2020    ABSCRN Negative 2020    [id number] Non Reactive 2020    URINECX Final report 2020       Recent Labs  Lab Results   Component Value Date    HGB 10.8 (L) 10/06/2020    HCT 41.1 2020    WBC  11.6 (H) 2020     2020           Assessment   1. IUP with an Estimated Date of Delivery: 20  2. Planned  section on 2020 for previous  section X 2, not a  candidate.     Plan   1. Repeat  with sterilization    Cindy Morrow,   12/15/2020          Electronically signed by Cindy Morrow DO at 12/15/20 0834             Cindy Morrow DO at 12/15/20 0829          [facility]  Reva Hamm  : 1985  MRN: [id number]  CSN: [id number]    History and Physical    Subjective   Reva Hamm is a 35 y.o. year old  with an Estimated Date of Delivery: 20 scheduled on 2020 for  delivery due to previous  section X 2, not a  candidate.  She is planning for sterilization at the time of the .    Prenatal care has been with Dr. Renata Morrow.  It has been benign.    OB History    Para Term  AB Living   3 2 2 0 0 2   SAB TAB Ectopic Molar Multiple Live Births   0 0 0 0 0 2      # Outcome Date GA Lbr Arthur/2nd Weight Sex Delivery Anes PTL Lv   3 Current            2 Term 14   4309 g (9 lb 8 oz) F CS-LTranv   BISHOP   1 Term 09   4082 g (9 lb) M CS-LTranv   BISHOP     No past medical history on file.  Past Surgical History:   Procedure Laterality Date   •  SECTION         Current Outpatient Medications:   •  Prenat w/o K-IR-Tmwqsdt-FA-DHA (PNV-DHA) 27-0.6-0.4-300 MG capsule, Take  by mouth., Disp: , Rfl:   No family history on file.    Allergies   Allergen Reactions   • Amoxicillin Other (See Comments)     Thrush/yeast infection     Social History    Tobacco Use      Smoking status: Never Smoker      Smokeless tobacco: Never Used    Review of Systems      Objective   LMP 03/15/2020   General: well developed; well nourished  no acute distress   Heart: Not performed.   Lungs: breathing is unlabored   Abdomen: soft, non-tender; no masses  no umbilical or inguinal  hernias are present  no hepato-splenomegaly     Cervix:   Not checked  Prenatal Labs  Lab Results   Component Value Date    HGB 10.8 (L) 10/06/2020    HEPBSAG Negative 2020    ABORH A Rh Positive 11/10/2014    ABO A 2020    RH Positive 2020    ABSCRN Negative 2020    [id number] Non Reactive 2020    URINECX Final report 2020       Recent Labs  Lab Results   Component Value Date    HGB 10.8 (L) 10/06/2020    HCT 41.1 2020    WBC 11.6 (H) 2020     2020           Assessment   3. IUP with an Estimated Date of Delivery: 20  4. Planned  section on 2020 for previous  section X 2, not a  candidate.     Plan   2. Repeat  with sterilization    Cindy Camacho DO  12/15/2020          Electronically signed by Cindy Camacho DO at 12/15/20 7251

## 2020-12-18 NOTE — LACTATION NOTE
This note was copied from a baby's chart.  Infant;  Melvin  :  20    Assisted with bfing session. Infant in ventral position with size 16 mm nipple shield. Melvin grasped nipple with deep gape after some attempts. Some suckling noted, no swallows, before he slept at breast. Demonstrated waking techniques, Anny relfex. Infant placed across chest, diagonal ventral. A few suckles noted before infant asleep again. Infant then gowned, double swaddled and given to Dad.    Pumping:  Mother had Medela at home. She has already become familiar with its use.  Provided kit with size 24 mm flanges and assisted with Symphony pumping session. Only obtained drops in 20 mins of pumping. Massaged breast for pt while she held flanges in place. Few drops syringed to infant for oral care. Manual given. Encouraged pumping every 3 hours,and using manual for brief periods in between electric pumping. Also advised using hands on breast for all feedings/pumping as hand expression is most helpful. Praised mother, and noted skin to skin, nipple stimulation will aid increase in colostrum. Parents appreciative.

## 2020-12-18 NOTE — ANESTHESIA PREPROCEDURE EVALUATION
Anesthesia Evaluation     Patient summary reviewed   NPO Solid Status: > 8 hours  NPO Liquid Status: > 8 hours           Airway   Mallampati: I  No difficulty expected  Dental - normal exam     Pulmonary - negative pulmonary ROS and normal exam   Cardiovascular - negative cardio ROS and normal exam        Neuro/Psych- negative ROS  GI/Hepatic/Renal/Endo    (+) morbid obesity,      Musculoskeletal (-) negative ROS    Abdominal  - normal exam   Substance History - negative use     OB/GYN    (+) Pregnant,     Comment: [address]t [address], 2 previous c-sections      Other - negative ROS                     Anesthesia Plan    ASA 3     spinal       Anesthetic plan, all risks, benefits, and alternatives have been provided, discussed and informed consent has been obtained with: patient.

## 2020-12-18 NOTE — ANESTHESIA PROCEDURE NOTES
Spinal Block      Patient reassessed immediately prior to procedure    Patient location during procedure: OB  Indication:procedure for pain  Performed By  CRNA: Ayo Molina CRNA  Preanesthetic Checklist  Completed: patient identified, site marked, surgical consent, pre-op evaluation, timeout performed, IV checked, risks and benefits discussed and monitors and equipment checked  Spinal Block Prep:  Patient Position:sitting  Sterile Tech:cap, gloves, mask and sterile barriers  Prep:Chloraprep  Patient Monitoring:blood pressure monitoring and continuous pulse oximetry  Spinal Block Procedure  Approach:midline  Guidance:landmark technique and palpation technique  Location:L3-L4  Needle Type:Pencan  Needle Gauge:25 G  Placement of Spinal needle event:cerebrospinal fluid aspirated  Paresthesia: no  Fluid Appearance:clear     Post Assessment  Patient Tolerance:patient tolerated the procedure well with no apparent complications  Complications no  Additional Notes  X 1 ATTEMPT, 3ML 1% LIDOCAINE LOCAL INFILTRATION, NEGATIVE PARESTHESIA

## 2020-12-18 NOTE — OP NOTE
[facility]  Reva Hamm  : 1985  MRN: [id number]  [facility]: [id number]  Date: 2020    Operative Note     SECTION REPEAT WITH TUBAL      Pre-op Diagnosis:  Encounter for sterilization [Z30.2]  S/P  section [Z98.891]   Post-op Diagnosis:  Post-Op Diagnosis Codes:     * Encounter for sterilization [Z30.2]     * S/P  section [Z98.891]   Procedure: Procedure(s):   SECTION REPEAT WITH SALPINGECTOMY   Surgeon: Surgeon(s):  Cindy Camacho DO       Anesthesia: Spinal by PATRICIA Molina CRNA     Estimated Blood Loss: 800   mls   Fluids: 2000   mls   UOP: 200   mls   Drains: Bearden catheter   ABx: Ancef     Specimens:  Bilateral fallopian tubes, placenta and cord   Findings: Omentum adhered to anterior abdominal wall   Complications: none      INDICATION: Reva Hamm is a 35 y.o. female  3 para 2 at 39 weeks was scheduled repeat  section.  She also desired permanent sterilization.  Risk benefits and alternatives to permanent sterilization were discussed with the patient.  Risk benefits and alternatives to  section were discussed with patient.  Risk including damage to underlying organs as well as bleeding and infection were discussed with the patient and the patient elected to proceed.     PROCEDURE: After informed consent was obtained, the patient was taken to the operating room where spinal anesthesia was administered. Time out procedure was completed and perioperative antibiotics were administered.  She was then tested prior to prepping and draping her.  She was placed in the supine position with leftward tilt and her abdomen was prepped and draped in normal sterile fashion after a Bearden catheter was placed by nursing staff.   After confirming adequate anesthesia, a Pfannenstiel incision was made with a scalpel 2 fingerbreadths above the pubic symphysis.  This was carried down sharply to the underlying fascia which was incised in the midline.  The  fascial incision was extended laterally with Gomez scissors.  The fascial edges were then elevated and the underlying rectus muscles dissected off with sharp technique.  The rectus muscles were  in the midline and the underlying peritoneum identified and entered bluntly.  Adhesions were noted on the left side of the abdomen and the decision was made to Cherney the muscle.  The peritoneal incision was then extended and the Skyler-0 retractor inserted.  The vesicouterine peritoneum was sharply incised with Metzenbaum scissors to create a bladder flap.  A low transverse uterine incision was made with a clean scalpel.  The uterine incision was bluntly extended and amniotomy was performed returning clear fluid.  The head of the infant was delivered through the incision without difficulty and the remainder the infant delivered with fundal pressure.  The infant was vigorous on the field, the cord was clamped  and cut after allowing for 30 seconds of delayed cord clamping, and the infant handed off to waiting nursery nurse.  Cord blood and gases were obtained and the placenta then manually removed. The uterine incision was closed with a running locked suture of 0 Monocryl.  A second imbricating layer of 0 Monocryl was placed for reinforcement.At this point omental adhesions were then cleared from the anterior surface of the uterus to allow for the uterus to be removed from the abdomen for sterilization.  The uterus was exteriorized and cleared of clot and debris with a moist laparotomy sponge.    The uterine incision was hemostatic.  At this point attention was then turned to the salpingectomy.  The right fallopian tube was grasped with a Ara.  Bovie electrocautery was used to separate the fallopian tube from the mesosalpinx.  The free ends were both tied with plain gut.  Excellent hemostasis was noted.  The left tube was then removed in a similar fashion.  The area on the left side of the uterus where the  fallopian tube was removed was noted to bleed.  2-0 Vicryl was placed in a figure-of-eight fashion.  Excellent hemostasis was noted.  The uterus was then returned to the abdomen.  At this point the uterine incision was noted to be oozing.  Several figure-of-eight 2-0 Vicryl as well as 0 Vicryl were placed.  Excellent hemostasis was noted.  The Skyler O was then removed.  The uterine incision was then reinspected.  Zaire was applied.   The  the rectus muscles were reapproximated 2-0 Vicryl righted previously been cut.  The subfascial spaces and rectus muscles were inspected with hemostasis noted.  The fascia was then closed with a running suture of 0 Vicryl.  The subcutaneous tissues were irrigated and made hemostatic with Bovie cautery.  The subcutaneous tissues were reapproximated with interrupted sutures of 2-0 plain gut.  The skin was closed with a subcuticular stitch of 4-0 Monocryl and reinforced with Steri-Strips.  A sterile pressure dressing was then applied.    After expressing the uterus the patient was transitioned to the stretcher and taken to the recovery room in stable condition.  She tolerated the procedure well without complications.  All sponge, needle, and instrument counts were correct ×3 per the OR staff.    Cindy Camacho DO   12/18/2020  10:38 CST

## 2020-12-18 NOTE — INTERVAL H&P NOTE
H&P reviewed. The patient was examined and there are no changes to the H&P. Stressed permanence of procedure with salpingectomy.

## 2020-12-19 LAB
BASOPHILS # BLD AUTO: 0.03 10*3/MM3 (ref 0–0.2)
BASOPHILS NFR BLD AUTO: 0.2 % (ref 0–1.5)
DEPRECATED RDW RBC AUTO: 46.4 FL (ref 37–54)
EOSINOPHIL # BLD AUTO: 0.05 10*3/MM3 (ref 0–0.4)
EOSINOPHIL NFR BLD AUTO: 0.4 % (ref 0.3–6.2)
ERYTHROCYTE [DISTWIDTH] IN BLOOD BY AUTOMATED COUNT: 15.5 % (ref 12.3–15.4)
HCT VFR BLD AUTO: 26.1 % (ref 34–46.6)
HGB BLD-MCNC: 8.6 G/DL (ref 12–15.9)
IMM GRANULOCYTES # BLD AUTO: 0.07 10*3/MM3 (ref 0–0.05)
IMM GRANULOCYTES NFR BLD AUTO: 0.6 % (ref 0–0.5)
LYMPHOCYTES # BLD AUTO: 1.26 10*3/MM3 (ref 0.7–3.1)
LYMPHOCYTES NFR BLD AUTO: 10.5 % (ref 19.6–45.3)
MCH RBC QN AUTO: 27.6 PG (ref 26.6–33)
MCHC RBC AUTO-ENTMCNC: 33 G/DL (ref 31.5–35.7)
MCV RBC AUTO: 83.7 FL (ref 79–97)
MONOCYTES # BLD AUTO: 1.28 10*3/MM3 (ref 0.1–0.9)
MONOCYTES NFR BLD AUTO: 10.6 % (ref 5–12)
NEUTROPHILS NFR BLD AUTO: 77.7 % (ref 42.7–76)
NEUTROPHILS NFR BLD AUTO: 9.33 10*3/MM3 (ref 1.7–7)
NRBC BLD AUTO-RTO: 0 /100 WBC (ref 0–0.2)
PLATELET # BLD AUTO: 289 10*3/MM3 (ref 140–450)
PMV BLD AUTO: 10.6 FL (ref 6–12)
RBC # BLD AUTO: 3.12 10*6/MM3 (ref 3.77–5.28)
WBC # BLD AUTO: 12.02 10*3/MM3 (ref 3.4–10.8)

## 2020-12-19 PROCEDURE — 25010000002 KETOROLAC TROMETHAMINE PER 15 MG: Performed by: OBSTETRICS & GYNECOLOGY

## 2020-12-19 PROCEDURE — 99231 SBSQ HOSP IP/OBS SF/LOW 25: CPT | Performed by: OBSTETRICS & GYNECOLOGY

## 2020-12-19 PROCEDURE — 25010000002 KETOROLAC TROMETHAMINE PER 15 MG: Performed by: NURSE ANESTHETIST, CERTIFIED REGISTERED

## 2020-12-19 PROCEDURE — 85025 COMPLETE CBC W/AUTO DIFF WBC: CPT | Performed by: OBSTETRICS & GYNECOLOGY

## 2020-12-19 RX ORDER — HYDROCODONE BITARTRATE AND ACETAMINOPHEN 10; 325 MG/1; MG/1
1 TABLET ORAL EVERY 4 HOURS PRN
Status: DISCONTINUED | OUTPATIENT
Start: 2020-12-19 | End: 2020-12-20 | Stop reason: HOSPADM

## 2020-12-19 RX ORDER — OXYCODONE AND ACETAMINOPHEN 10; 325 MG/1; MG/1
1 TABLET ORAL EVERY 4 HOURS PRN
Status: DISCONTINUED | OUTPATIENT
Start: 2020-12-19 | End: 2020-12-19

## 2020-12-19 RX ORDER — HYDROCODONE BITARTRATE AND ACETAMINOPHEN 5; 325 MG/1; MG/1
1 TABLET ORAL EVERY 4 HOURS PRN
Status: DISCONTINUED | OUTPATIENT
Start: 2020-12-19 | End: 2020-12-20 | Stop reason: HOSPADM

## 2020-12-19 RX ORDER — OXYCODONE HYDROCHLORIDE AND ACETAMINOPHEN 5; 325 MG/1; MG/1
1 TABLET ORAL EVERY 4 HOURS PRN
Status: DISCONTINUED | OUTPATIENT
Start: 2020-12-19 | End: 2020-12-19

## 2020-12-19 RX ADMIN — IBUPROFEN 800 MG: 800 TABLET, FILM COATED ORAL at 18:40

## 2020-12-19 RX ADMIN — KETOROLAC TROMETHAMINE 30 MG: 30 INJECTION, SOLUTION INTRAMUSCULAR; INTRAVENOUS at 12:41

## 2020-12-19 RX ADMIN — PRENATAL VIT W/ FE FUMARATE-FA TAB 27-0.8 MG 1 TABLET: 27-0.8 TAB at 09:57

## 2020-12-19 RX ADMIN — KETOROLAC TROMETHAMINE 30 MG: 30 INJECTION, SOLUTION INTRAMUSCULAR; INTRAVENOUS at 06:35

## 2020-12-19 RX ADMIN — HYDROCODONE BITARTRATE AND ACETAMINOPHEN 1 TABLET: 5; 325 TABLET ORAL at 17:06

## 2020-12-19 RX ADMIN — KETOROLAC TROMETHAMINE 30 MG: 30 INJECTION, SOLUTION INTRAMUSCULAR; INTRAVENOUS at 00:00

## 2020-12-19 RX ADMIN — HYDROCODONE BITARTRATE AND ACETAMINOPHEN 1 TABLET: 10; 325 TABLET ORAL at 23:09

## 2020-12-19 RX ADMIN — HYDROCODONE BITARTRATE AND ACETAMINOPHEN 1 TABLET: 5; 325 TABLET ORAL at 05:18

## 2020-12-19 NOTE — PLAN OF CARE
Problem: Adult Inpatient Plan of Care  Goal: Plan of Care Review  2020 by Jaclyn Meadows RN  Outcome: Ongoing, Progressing  Flowsheets (Taken 2020)  Progress: improving  Plan of Care Reviewed With: patient  Outcome Summary:   VSS; FFMLUU with light/scant lochia; boggs draining adequate amount of clear urine; abd incision pressure dressing C/D; scheduled toradol given for pain control; not passing gas but bowel sounds audible. SCDs on. Patient using breast pump and supplementing with formula overnight. Patient interacting and participating in  care.  1

## 2020-12-19 NOTE — PROGRESS NOTES
[facility]   PROGRESS NOTE    Post-Op Day 1 S/P   Subjective     Patient reports:  Pain is well controlled with prescription NSAID's including ketorolac (Toradol) and narcotic analgesics including hydrocodone/acetaminophen (Lorcet, Lortab, Norco, Vicodin).  She is ambulating. Tolerating diet. Voiding - boggs just removed; flatus reported..  Vaginal bleeding is as much as expected.      Objective      Vitals: Vital Signs Range for the last 24 hours  Temperature: Temp:  [97.3 °F (36.3 °C)-98.9 °F (37.2 °C)] 98.4 °F (36.9 °C)   Temp Source: Temp src: Temporal   BP: BP: (103-145)/(60-88) 103/60   Pulse: Heart Rate:  [] 95   Respirations: Resp:  [16-18] 16   SPO2: SpO2:  [96 %-100 %] 97 %   O2 Amount (l/min):     O2 Devices Device (Oxygen Therapy): room air   Weight:              Physical Exam    Lungs normal effort   Abdomen Normal effort   Incision  Pressure dressing covering incision site   Extremities extremities normal, atraumatic, no cyanosis or edema     I reviewed the patient's new clinical results.  Lab Results (last 24 hours)     Procedure Component Value Units Date/Time    CBC & Differential [[id number]]  (Abnormal) Collected: 20    Specimen: Blood Updated: 20    Narrative:      The following orders were created for panel order CBC & Differential.  Procedure                               Abnormality         Status                     ---------                               -----------         ------                     CBC Auto Differential[[id number]]        Abnormal            Final result                 Please view results for these tests on the individual orders.    CBC Auto Differential [[id number]]  (Abnormal) Collected: 20    Specimen: Blood Updated: 20     WBC 12.02 10*3/mm3      RBC 3.12 10*6/mm3      Hemoglobin 8.6 g/dL      Hematocrit 26.1 %      MCV 83.7 fL      MCH 27.6 pg      MCHC 33.0 g/dL      RDW 15.5 %      RDW-SD 46.4 fl       MPV 10.6 fL      Platelets 289 10*3/mm3      Neutrophil % 77.7 %      Lymphocyte % 10.5 %      Monocyte % 10.6 %      Eosinophil % 0.4 %      Basophil % 0.2 %      Immature Grans % 0.6 %      Neutrophils, Absolute 9.33 10*3/mm3      Lymphocytes, Absolute 1.26 10*3/mm3      Monocytes, Absolute 1.28 10*3/mm3      Eosinophils, Absolute 0.05 10*3/mm3      Basophils, Absolute 0.03 10*3/mm3      Immature Grans, Absolute 0.07 10*3/mm3      nRBC 0.0 /100 WBC     Narrative:      ckd    Tissue Pathology Exam [[id number]] Collected: 20 0822    Specimen: Tissue from Fallopian Tube, Left; Tissue from Fallopian Tube, Right; Tissue from Placenta Updated: 20 1117          Assessment/Plan        Encounter for sterilization    S/P  section    History of  section      Assessment:    Reva Hamm is Day 1  post-partum  , Low Transverse   .       Plan:  remove dressing, saline lock IV fluids, advance diet  normal diet as tolerated, remove drains, continue post op care and plan for discharge tomorrow.        Cindy Camacho DO  2020  08:23 CST

## 2020-12-19 NOTE — ANESTHESIA POSTPROCEDURE EVALUATION
Patient: Reva Hamm    Procedure Summary     Date: 20 Room / Location: [facility] LABOR DELIVERY 1 / [facility] LABOR DELIVERY    Anesthesia Start: 748 Anesthesia Stop: 956    Procedure:  SECTION REPEAT WITH SALPINGECTOMY (N/A Abdomen) Diagnosis:       Encounter for sterilization      S/P  section      (Encounter for sterilization [Z30.2])      (S/P  section [Z98.891])    Surgeon: Cindy Camacho DO Provider: Ayo Molina CRNA    Anesthesia Type: spinal ASA Status: 3          Anesthesia Type: spinal    Vitals  Vitals Value Taken Time   /72 20 1237   Temp 97.8 °F (36.6 °C) 20 1237   Pulse 88 20 1237   Resp 18 20 1237   SpO2 99 % 20 1237           Post Anesthesia Care and Evaluation    Patient location during evaluation: floor  Patient participation: complete - patient participated  Level of consciousness: awake  Pain score: 3  Pain management: adequate  Airway patency: patent  Anesthetic complications: No anesthetic complications  PONV Status: none  Cardiovascular status: acceptable and stable  Respiratory status: acceptable  Hydration status: acceptable  Post Neuraxial Block status: Motor and sensory function returned to baseline and No signs or symptoms of PDPH

## 2020-12-19 NOTE — PLAN OF CARE
Goal Outcome Evaluation:  Plan of Care Reviewed With: patient  Progress: improving  Outcome Summary: VSS; FF/ML/UU-U1 with light lochia; voiding; ambulating; utilizing toradol et norco for pain management; has pumped twice this shift; ALT incision - well approximated with steri-strips intact

## 2020-12-19 NOTE — LACTATION NOTE
Mother's Name: Reva Hamm Phone #: [phone]  Infant Name: Melvin :2021  Gestation:39w0d  Day of life:1  Birth weight:  7-1 (3204g) Discharge weight:  Weight Loss: - 4.13%  24 hour Summary of Feeds: 5BF + 5 Formula Voids: 4 Stools:  2  Assistive devices (shields, shells, etc): 20 mm shield  Significant Maternal history:, attempted breastfeeding with first 2 children but not successful- 11 yr old and 6 yr old  Maternal Concerns: latching and milk supply  Maternal Goal: Exclusive breastfeeding for 1 year if possible  Mother's Medications: PNV  Breastpump for home: YES. Spectra and willow breast pump  Ped follow up appt:    Discussed feeding plan. Patient wishes to supplement with formula due to feeling she has no milk. Discussed expected drops with pumping and hand expressing, and the difference in pumping and direct breastfeeding. Recommended applying formula to nipple shield to help with latching and stimulation to suck or using syringe at the breast. Recommended attempting to breastfeed, supplementing with formula, then pumping with each feeding to encouraged adequate production. She states she has pumped and expressed only drops. Assured her this is expected as the pump is only negative suction and she has no emotional/hormonal connection to the pump, if anything it creates stress. Encouraged skin to skin. Breastfeeding + Supplementation packet provided. Offered assistance, support, and encouragement.     Instructed mom our lactation team is here for continued support throughout their breastfeeding journey. Our team has encouraged mom to call with any questions or concerns that may arise after discharge.

## 2020-12-19 NOTE — PLAN OF CARE
Goal Outcome Evaluation:  Plan of Care Reviewed With: patient  Progress: improving  Outcome Summary: VSS; FF/ML/UU with light lochia; boggs patent; discomfort managed with toradol; nausea resolved; breastfeeding with lactation support;  attentive; IV restarted et saline locked

## 2020-12-20 VITALS
DIASTOLIC BLOOD PRESSURE: 80 MMHG | BODY MASS INDEX: 48.37 KG/M2 | OXYGEN SATURATION: 98 % | WEIGHT: 290.3 LBS | HEIGHT: 65 IN | TEMPERATURE: 97.3 F | RESPIRATION RATE: 18 BRPM | SYSTOLIC BLOOD PRESSURE: 127 MMHG | HEART RATE: 92 BPM

## 2020-12-20 PROCEDURE — 99238 HOSP IP/OBS DSCHRG MGMT 30/<: CPT | Performed by: OBSTETRICS & GYNECOLOGY

## 2020-12-20 RX ORDER — HYDROCODONE BITARTRATE AND ACETAMINOPHEN 10; 325 MG/1; MG/1
1 TABLET ORAL EVERY 4 HOURS PRN
Qty: 20 TABLET | Refills: 0 | Status: SHIPPED | OUTPATIENT
Start: 2020-12-20 | End: 2020-12-26

## 2020-12-20 RX ADMIN — DOCUSATE SODIUM 100 MG: 100 CAPSULE ORAL at 09:30

## 2020-12-20 RX ADMIN — IBUPROFEN 800 MG: 800 TABLET, FILM COATED ORAL at 04:29

## 2020-12-20 RX ADMIN — HYDROCODONE BITARTRATE AND ACETAMINOPHEN 1 TABLET: 10; 325 TABLET ORAL at 10:15

## 2020-12-20 RX ADMIN — IBUPROFEN 800 MG: 800 TABLET, FILM COATED ORAL at 12:48

## 2020-12-20 RX ADMIN — HYDROCODONE BITARTRATE AND ACETAMINOPHEN 1 TABLET: 10; 325 TABLET ORAL at 04:29

## 2020-12-20 RX ADMIN — PRENATAL VIT W/ FE FUMARATE-FA TAB 27-0.8 MG 1 TABLET: 27-0.8 TAB at 09:30

## 2020-12-20 NOTE — PROGRESS NOTES
[facility]   PROGRESS NOTE    Post-Op Day 2 S/P   Subjective     Patient reports:  Pain is well controlled with prescription NSAID's including ibuprofen (Motrin) and narcotic analgesics including Percocet.  She is ambulating. Tolerating diet. Voiding - without difficulty; flatus reported..  Vaginal bleeding is as much as expected.      Objective      Vitals: Vital Signs Range for the last 24 hours  Temperature: Temp:  [97.8 °F (36.6 °C)-99.1 °F (37.3 °C)] 98.9 °F (37.2 °C)   Temp Source: Temp src: Temporal   BP: BP: (119-130)/(72-86) 128/78   Pulse: Heart Rate:  [] 89   Respirations: Resp:  [16-18] 16   SPO2: SpO2:  [96 %-99 %] 97 %   O2 Amount (l/min):     O2 Devices Device (Oxygen Therapy): room air   Weight:              Physical Exam    Lungs normal effort   Abdomen Soft, non-tender, normal bowel sounds; no bruits, organomegaly or masses.   Incision  healing well, no drainage, no erythema, no seroma, no swelling, well approximated   Extremities extremities normal, atraumatic, no cyanosis or edema     I reviewed the patient's new clinical results.  Lab Results (last 24 hours)     ** No results found for the last 24 hours. **          Assessment/Plan        Encounter for sterilization    S/P  section    History of  section      Assessment:    Reva Hamm is Day 2  post-partum  , Low Transverse   .       Plan:  plan for discharge today.        Cindy Camacho DO  2020  09:04 CST

## 2020-12-20 NOTE — PLAN OF CARE
Problem: Adult Inpatient Plan of Care  Goal: Plan of Care Review  Outcome: Ongoing, Progressing  Flowsheets (Taken 12/20/2020 0517)  Outcome Summary: VSS, FFMLU2.Scant lochia no clots no odor.Voiding and ambulating without difficulty. Pt states pain is well tolerated with PO norco and motrin. ALT incision is CDI. Steri-strips intact.  Goal: Patient-Specific Goal (Individualized)  Outcome: Ongoing, Progressing  Goal: Absence of Hospital-Acquired Illness or Injury  Outcome: Ongoing, Progressing  Intervention: Identify and Manage Fall Risk  Recent Flowsheet Documentation  Taken 12/20/2020 0500 by Raisa Hendrix RN  Safety Promotion/Fall Prevention: safety round/check completed  Taken 12/20/2020 0429 by Raisa Hendrix RN  Safety Promotion/Fall Prevention: safety round/check completed  Taken 12/20/2020 0400 by Raisa Hendrix RN  Safety Promotion/Fall Prevention: safety round/check completed  Taken 12/20/2020 0345 by Raisa Hendrix RN  Safety Promotion/Fall Prevention: safety round/check completed  Taken 12/20/2020 0200 by Raisa Hendrix RN  Safety Promotion/Fall Prevention: safety round/check completed  Taken 12/20/2020 0130 by Raisa Hendrix RN  Safety Promotion/Fall Prevention: safety round/check completed  Taken 12/20/2020 0000 by Raisa Hendrix RN  Safety Promotion/Fall Prevention: safety round/check completed  Taken 12/19/2020 2309 by Raisa Hendrix RN  Safety Promotion/Fall Prevention: safety round/check completed  Taken 12/19/2020 2200 by Raisa Hendrix RN  Safety Promotion/Fall Prevention: safety round/check completed  Taken 12/19/2020 2100 by Raisa Hendrix RN  Safety Promotion/Fall Prevention: safety round/check completed  Taken 12/19/2020 2000 by Raisa Hendrix RN  Safety Promotion/Fall Prevention: safety round/check completed  Taken 12/19/2020 1900 by Raisa Hendrix RN  Safety Promotion/Fall Prevention: safety round/check completed  Goal: Optimal  Comfort and Wellbeing  Outcome: Ongoing, Progressing  Intervention: Provide Person-Centered Care  Recent Flowsheet Documentation  Taken 2020 by Raisa Hendrix RN  Trust Relationship/Rapport: care explained  Goal: Readiness for Transition of Care  Outcome: Ongoing, Progressing     Problem: Adjustment to Role Transition (Postpartum  Delivery)  Goal: Successful Maternal Role Transition  Outcome: Ongoing, Progressing     Problem: Bleeding (Postpartum  Delivery)  Goal: Hemostasis  Outcome: Ongoing, Progressing     Problem: Infection (Postpartum  Delivery)  Goal: Absence of Infection Signs and Symptoms  Outcome: Ongoing, Progressing     Problem: Pain (Postpartum  Delivery)  Goal: Acceptable Pain Control  Outcome: Ongoing, Progressing  Intervention: Prevent or Manage Pain  Recent Flowsheet Documentation  Taken 2020 045 by Raisa Hendrix RN  Pain Management Interventions:   prayer utilized   pillow support provided  Taken 2020 042 by Raisa Hendrix RN  Pain Management Interventions: see MAR  Taken 2020 2309 by Raisa Hendrix RN  Pain Management Interventions: see MAR     Problem: Postoperative Nausea and Vomiting (Postpartum  Delivery)  Goal: Nausea and Vomiting Relief  Outcome: Ongoing, Progressing     Problem: Postoperative Urinary Retention (Postpartum  Delivery)  Goal: Effective Urinary Elimination  Outcome: Ongoing, Progressing  Intervention: Monitor and Manage Urinary Retention  Recent Flowsheet Documentation  Taken 2020 by Raisa Hendirx RN  Urinary Elimination Promotion: frequent voiding encouraged   Goal Outcome Evaluation:  Plan of Care Reviewed With: patient  Progress: improving  Outcome Summary: VSS, FFMLU2.Scant lochia no clots no odor.Voiding and ambulating without difficulty. Pt states pain is well tolerated with PO norco and motrin. ALT incision is CDI. Steri-strips intact.

## 2020-12-20 NOTE — DISCHARGE SUMMARY
Discharge Summary     Isa Hamm  : 1985  MRN: [id number]  CSN: [id number]    Date of Admission: 2020   Date of Discharge:  2020   Delivering Physician: Cindy Camacho DO       Admission Diagnosis: 1. Encounter for sterilization [Z30.2]  2. S/P  section [Z98.891]  3. History of  section [Z98.891]   Discharge Diagnosis: 1. Pregnancy at 39w0d - delivered       Procedures: 1. Repeat  (LTCS) with bilateral total salpingectomy     Hospital Course  See the completed operative report for details regarding antepartum course and delivery.    Her post-operative course was unremarkable.  On POD # 2 she felt like she ready for discharge.  She was evaluated by Dr. Camacho who agreed she was able to be discharged to home.  She had no febrile morbidity. She had normal bowel and bladder function and was hemodynamically stable.  Her wound was healing well without obvious signs of infections.    Infant  male  fetus weighing 3204 g (7 lb 1 oz)   Apgars -  8 @ 1 minute /  9 @ 5 minutes.    Discharge labs  Lab Results   Component Value Date    WBC 12.02 (H) 2020    HGB 8.6 (L) 2020    HCT 26.1 (L) 2020     2020       Discharge Medications     Discharge Medications      New Medications      Instructions Start Date   HYDROcodone-acetaminophen  MG per tablet  Commonly known as: NORCO   1 tablet, Oral, Every 4 Hours PRN         Continue These Medications      Instructions Start Date   PNV-DHA 27-0.6-0.4-300 MG capsule   Oral             Discharge Disposition Home or Self Care   Condition on Discharge: good   Follow-up: 2 weeks with Doug Camacho DO  2020

## 2020-12-21 ENCOUNTER — TELEPHONE (OUTPATIENT)
Dept: LABOR AND DELIVERY | Facility: HOSPITAL | Age: 35
End: 2020-12-21

## 2020-12-21 NOTE — TELEPHONE ENCOUNTER
Left msg for pt as she did not present for OP lactation appmt today. Offered to reschedule or provide phone support.

## 2020-12-21 NOTE — PAYOR COMM NOTE
"[place] HOME 20  [id number]   [phone]    HanselReva SOILA (35 y.o. Female)     Date of Birth Social Security Number Address Home Phone MRN    1985  [address] [place] [id number] [phone] [id number]    Muslim Marital Status          Hinduism        Admission Date Admission Type Admitting Provider Attending Provider Department, Room/Bed    20 Elective Cindy Camacho DO  [facility] MOTHER BABY 2A, M209/1    Discharge Date Discharge Disposition Discharge Destination        2020 Home or Self Care Home             Attending Provider: (none)   Allergies: Amoxicillin    Isolation: None   Infection: None   Code Status: Prior    Ht: 165.1 cm (65\")   Wt: 132 kg (290 lb 4.8 oz)    Admission Cmt: None   Principal Problem: None                Active Insurance as of 2020     Primary Coverage     Payor Plan Insurance Group Employer/Plan Group    [employer]HE[employer] MEDICAID [employer] MEDICAID [id number]     Payor Plan Address Payor Plan Phone Number Payor Plan Fax Number Effective Dates    [address] [address] [phone]  2019 - None Entered    [facility] [address]       Subscriber Name Subscriber Birth Date Member ID       REVA JHAVERI 1985 [id number]                 Emergency Contacts      (Rel.) Home Phone Work Phone Mobile Phone    Gilbert Claudio (Spouse) [phone] -- [phone]               Discharge Summary      Cindy Camacho DO at 20 0907          Discharge Summary    [facility]  Reva SOILA Jhaveri  : 1985  MRN: [id number]  CSN: [id number]    Date of Admission: 2020   Date of Discharge:  2020   Delivering Physician: Cindy Camacho DO       Admission Diagnosis: 1. Encounter for sterilization [Z30.2]  2. S/P  section [Z98.891]  3. History of  section [Z98.891]   Discharge Diagnosis: 1. Pregnancy at 39w0d - delivered       Procedures: 1. Repeat  " (LTCS) with bilateral total salpingectomy     Hospital Course  See the completed operative report for details regarding antepartum course and delivery.    Her post-operative course was unremarkable.  On POD # 2 she felt like she ready for discharge.  She was evaluated by Dr. Camacho who agreed she was able to be discharged to home.  She had no febrile morbidity. She had normal bowel and bladder function and was hemodynamically stable.  Her wound was healing well without obvious signs of infections.    Infant  male  fetus weighing 3204 g (7 lb 1 oz)   Apgars -  8 @ 1 minute /  9 @ 5 minutes.    Discharge labs  Lab Results   Component Value Date    WBC 12.02 (H) 12/19/2020    HGB 8.6 (L) 12/19/2020    HCT 26.1 (L) 12/19/2020     12/19/2020       Discharge Medications     Discharge Medications      New Medications      Instructions Start Date   HYDROcodone-acetaminophen  MG per tablet  Commonly known as: NORCO   1 tablet, Oral, Every 4 Hours PRN         Continue These Medications      Instructions Start Date   PNV-DHA 27-0.6-0.4-300 MG capsule   Oral             Discharge Disposition Home or Self Care   Condition on Discharge: good   Follow-up: 2 weeks with Doug Camacho DO  12/20/2020        Electronically signed by Cindy Camacho DO at 12/20/20 0605

## 2020-12-24 LAB
CYTO UR: NORMAL
LAB AP CASE REPORT: NORMAL
PATH REPORT.FINAL DX SPEC: NORMAL
PATH REPORT.GROSS SPEC: NORMAL

## 2021-01-04 ENCOUNTER — TELEPHONE (OUTPATIENT)
Dept: OBSTETRICS AND GYNECOLOGY | Facility: CLINIC | Age: 36
End: 2021-01-04

## 2021-01-04 NOTE — TELEPHONE ENCOUNTER
Pt called office with c/o yellow drainage and odor from  incision. Pt denies opening of incision. I offered pt to be seen today and she states that she can not come in today due to no vehicle when  leaves for work. Per Dr Camacho, ok for pt to be seen in office tomorrow morning. Info given to Angie MCCULLOUGH to make appointment.

## 2021-01-05 ENCOUNTER — POSTPARTUM VISIT (OUTPATIENT)
Dept: OBSTETRICS AND GYNECOLOGY | Facility: CLINIC | Age: 36
End: 2021-01-05

## 2021-01-05 VITALS
BODY MASS INDEX: 44.82 KG/M2 | SYSTOLIC BLOOD PRESSURE: 136 MMHG | DIASTOLIC BLOOD PRESSURE: 82 MMHG | HEIGHT: 65 IN | WEIGHT: 269 LBS

## 2021-01-05 DIAGNOSIS — Z98.890 POSTOPERATIVE STATE: Primary | ICD-10-CM

## 2021-01-05 PROCEDURE — 99213 OFFICE O/P EST LOW 20 MIN: CPT | Performed by: OBSTETRICS & GYNECOLOGY

## 2021-01-05 RX ORDER — FLUCONAZOLE 150 MG/1
150 TABLET ORAL ONCE
Qty: 2 TABLET | Refills: 0 | Status: SHIPPED | OUTPATIENT
Start: 2021-01-05 | End: 2021-01-05

## 2021-01-05 RX ORDER — FLUCONAZOLE 150 MG/1
150 TABLET ORAL ONCE
Qty: 1 TABLET | Refills: 0 | Status: CANCELLED | OUTPATIENT
Start: 2021-01-05 | End: 2021-01-05

## 2021-01-05 RX ORDER — CEPHALEXIN 500 MG/1
500 CAPSULE ORAL 2 TIMES DAILY
Qty: 14 CAPSULE | Refills: 0 | Status: SHIPPED | OUTPATIENT
Start: 2021-01-05 | End: 2021-08-11

## 2021-01-05 NOTE — PROGRESS NOTES
"Subjective   Reva Hamm is a 35 y.o. female.     Chief Complaint   Patient presents with   • Postpartum Care     PT is having some leaking from her incision site with a yellowish color and odor        35-year-old female  3 para 3 status post repeat  section on 2020 presents for follow-up.  Patient previously did not follow-up for post operative visit.  She reports overall she is doing well at this time.  She does however report that she has noticed minimal drainage from the right side of her incision.  She denies any fever or chills.  She is currently breast and bottlefeeding at this time.  She is urinating and ambulating without difficulty.  She reports her pain is well controlled.       Review of Systems   Constitutional: Negative for chills and fever.   Gastrointestinal: Positive for abdominal pain. Negative for constipation, diarrhea and vomiting.   Genitourinary: Negative for vaginal bleeding and vaginal discharge.       Objective   /82   Ht 165.1 cm (65\")   Wt 122 kg (269 lb)   LMP 03/15/2020   Breastfeeding Yes   BMI 44.76 kg/m²   Patient's last menstrual period was 03/15/2020.  Physical Exam  Vitals signs and nursing note reviewed.   Constitutional:       General: She is not in acute distress.     Appearance: She is well-developed.   HENT:      Head: Normocephalic and atraumatic.   Eyes:      General:         Right eye: No discharge.         Left eye: No discharge.      Conjunctiva/sclera: Conjunctivae normal.   Neck:      Musculoskeletal: Normal range of motion and neck supple.      Thyroid: No thyromegaly.   Pulmonary:      Effort: Pulmonary effort is normal.   Abdominal:      General: There is no distension.      Palpations: Abdomen is soft. There is no mass.      Tenderness: There is no abdominal tenderness. There is no guarding or rebound.      Hernia: No hernia is present.      Comments: Slight erythema noted around incision.  No drainage.  Incision probed " and no opening noted.   Musculoskeletal: Normal range of motion.   Skin:     General: Skin is warm and dry.   Neurological:      Mental Status: She is alert and oriented to person, place, and time.   Psychiatric:         Behavior: Behavior normal.         Judgment: Judgment normal.       Assessment/Plan   Problems Addressed this Visit     None      Visit Diagnoses     Postoperative state    -  Primary    Postpartum state          Diagnoses       Codes Comments    Postoperative state    -  Primary ICD-10-CM: Z98.890  ICD-9-CM: V45.89     Postpartum state     ICD-10-CM: Z39.2  ICD-9-CM: V24.2       Cultures collected with results to follow.  Discussed with patient starting her on Keflex at this time.  Discussed with patient that if her symptoms were to worsen she would need to seek immediate medical attention.  We will also send the patient Diflucan due to her previous history of yeast infections after antibiotics.       Cindy Camacho, DO

## 2021-01-11 LAB
BACTERIA SPEC AEROBE CULT: ABNORMAL
BACTERIA SPEC ANAEROBE CULT: ABNORMAL
BACTERIA SPEC CULT: ABNORMAL
OTHER ANTIBIOTIC SUSC ISLT: ABNORMAL

## 2021-01-15 ENCOUNTER — TELEPHONE (OUTPATIENT)
Dept: OBSTETRICS AND GYNECOLOGY | Facility: CLINIC | Age: 36
End: 2021-01-15

## 2021-01-15 RX ORDER — FLUCONAZOLE 150 MG/1
150 TABLET ORAL DAILY
Qty: 2 TABLET | Refills: 0 | Status: SHIPPED | OUTPATIENT
Start: 2021-01-15 | End: 2021-08-11

## 2021-01-15 RX ORDER — SULFAMETHOXAZOLE AND TRIMETHOPRIM 800; 160 MG/1; MG/1
1 TABLET ORAL 2 TIMES DAILY
Qty: 14 TABLET | Refills: 0 | Status: SHIPPED | OUTPATIENT
Start: 2021-01-15 | End: 2021-08-11

## 2021-01-15 NOTE — TELEPHONE ENCOUNTER
Pt called wanting to know if Dr Camacho was going to send her any med. I talked with Dr Gregg she is sending Bactrum and Diflucan , I gave pt this information.

## 2021-01-25 ENCOUNTER — TELEPHONE (OUTPATIENT)
Dept: OBSTETRICS AND GYNECOLOGY | Facility: CLINIC | Age: 36
End: 2021-01-25

## 2021-02-01 NOTE — PROGRESS NOTES
All care plans resolved, pt discharged   Good fetal movement  No contractions, no headache  Abdomen nontender, no edema, mood normal  Repeat BP normal  Normal Glucola and Hgb  Arrange  and sign BTL papers next visit   labor precautions

## 2021-08-11 ENCOUNTER — OFFICE VISIT (OUTPATIENT)
Dept: OBSTETRICS AND GYNECOLOGY | Facility: CLINIC | Age: 36
End: 2021-08-11

## 2021-08-11 VITALS
DIASTOLIC BLOOD PRESSURE: 74 MMHG | WEIGHT: 247 LBS | HEIGHT: 65 IN | BODY MASS INDEX: 41.15 KG/M2 | SYSTOLIC BLOOD PRESSURE: 122 MMHG

## 2021-08-11 DIAGNOSIS — Z32.01 POSITIVE PREGNANCY TEST: Primary | ICD-10-CM

## 2021-08-11 LAB
B-HCG UR QL: NEGATIVE
INTERNAL NEGATIVE CONTROL: NEGATIVE
INTERNAL POSITIVE CONTROL: POSITIVE
Lab: NORMAL

## 2021-08-11 PROCEDURE — 99213 OFFICE O/P EST LOW 20 MIN: CPT | Performed by: OBSTETRICS & GYNECOLOGY

## 2021-08-11 PROCEDURE — 81025 URINE PREGNANCY TEST: CPT | Performed by: OBSTETRICS & GYNECOLOGY

## 2021-08-11 NOTE — PROGRESS NOTES
"Subjective   Reva Hamm is a 36 y.o. female.     Chief Complaint   Patient presents with   • Possible Pregnancy     pt here and reports +UPT at home, had u/s today, c/o spotting, nausea, and cramping       36-year-old female  3 para 3 last menstrual period 2021 presents with positive pregnancy test.  Patient reports she had a positive home pregnancy test yesterday.  She reports she has had some spotting and some nausea and cramping.  She voices no new complaints at this time. At her last  section she had bilateral salpingectomy.       Review of Systems   Genitourinary: Positive for pelvic pain and vaginal bleeding.       Objective   /74 (BP Location: Right arm, Patient Position: Sitting, Cuff Size: Large Adult)   Ht 165.1 cm (65\")   Wt 112 kg (247 lb)   LMP 2021 (Approximate)   Breastfeeding No   BMI 41.10 kg/m²   Patient's last menstrual period was 2021 (approximate).  Physical Exam  Vitals and nursing note reviewed. Exam conducted with a chaperone present.   Constitutional:       General: She is not in acute distress.     Appearance: She is well-developed.   HENT:      Head: Normocephalic and atraumatic.   Eyes:      General:         Right eye: No discharge.         Left eye: No discharge.      Conjunctiva/sclera: Conjunctivae normal.   Neck:      Thyroid: No thyromegaly.   Pulmonary:      Effort: Pulmonary effort is normal.   Abdominal:      General: There is no distension.      Palpations: Abdomen is soft. There is no mass.      Tenderness: There is no abdominal tenderness. There is no guarding or rebound.      Hernia: No hernia is present.   Musculoskeletal:         General: Normal range of motion.      Cervical back: Normal range of motion and neck supple.   Skin:     General: Skin is warm and dry.   Neurological:      Mental Status: She is alert and oriented to person, place, and time.   Psychiatric:         Behavior: Behavior normal.         Judgment: " Judgment normal.       Assessment/Plan   Problems Addressed this Visit     None      Visit Diagnoses     Positive pregnancy test    -  Primary    Relevant Orders    POC Pregnancy, Urine (Completed)    HCG, B-subunit, Quantitative ([facility] Only)      Diagnoses       Codes Comments    Positive pregnancy test    -  Primary ICD-10-CM: Z32.01  ICD-9-CM: V72.42       Urine pregnancy test today in the office was negative.  Transvaginal ultrasound today revealed a possible ovarian cyst.  Will order a quantitative hCG at this time with results to follow.  Miscarriage and ectopic precautions discussed.  All questions answered patient verbalized understanding of plan.       Cindy Camacho,

## 2021-08-12 LAB — HCG INTACT+B SERPL-ACNC: <1 MIU/ML

## 2022-02-11 NOTE — LACTATION NOTE
"Mother's Name: Reva Phone #:  Infant Name: Melvin :2021  Gestation:39w0d  Day of life:0  Birth weight:  7-1 (3204g) Discharge weight:  Weight Loss:   24 hour Summary of Feeds:  Voids:  Stools:  Assistive devices (shields, shells, etc): 20 mm shield  Significant Maternal history:, attempted breastfeeding with first 2 children but not successful- 11 yr old and 6 yr old  Maternal Concerns: latching and milk supply  Maternal Goal: Exclusive breastfeeding for 1 year if possible  Mother's Medications: PNV  Breastpump for home: YES. [employer]ra and [employer]ow breast pump  Ped follow up appt:    Assisted with first feeding in recovery at 1.5 hrs after delivery, infant eager to feed for 1 hr. Mother agreeable with lactation assistance, states had difficulty latching and \"not enough milk\" with first 2 children. Infant brought to left breast in football hold, infant gapes wide and eager to latch. Breasts are firm and rounded, heavy, difficulty shaping breast for deeper latch. Multiple attempts made, infant can latch but unable to maintain deep latch, tongue draws back causing gumming intermittently, compressing nipple. Applied 20 mm nipple shield, attempted latching again but ultimately moved to ventral position where infant nursed comfortably for 20 mins. Few swallows observed at breast. Unable to hand express drops. Moved to right breast in ventral with shield and infant latched without difficulty, nursed additional 10 mins although sleepy. Initial breastfeeding packet with youtube video list given and reviewed briefly and with interruption due to 1 hour recovery and staff working with mother. Breastfeeding book provided.     Instructed mom our lactation team is here for continued support throughout their breastfeeding journey. Our team has encouraged mom to call with any questions or concerns that may arise after discharge.  " No

## (undated) DEVICE — ANTIBACTERIAL UNDYED BRAIDED (POLYGLACTIN 910), SYNTHETIC ABSORBABLE SUTURE: Brand: COATED VICRYL

## (undated) DEVICE — ANTIBACTERIAL VIOLET BRAIDED (POLYGLACTIN 910), SYNTHETIC ABSORBABLE SUTURE: Brand: COATED VICRYL

## (undated) DEVICE — Device

## (undated) DEVICE — PAD C-SECTION: Brand: MEDLINE INDUSTRIES, INC.

## (undated) DEVICE — SKIN AFFIX SURG ADHESIVE 72/CS 0.55ML: Brand: MEDLINE

## (undated) DEVICE — GLV SURG SENSICARE SLT PF LF 6 STRL

## (undated) DEVICE — APPL CHLORAPREP W/TINT 26ML ORNG

## (undated) DEVICE — SUT ETHLN 0 LP XLH 72IN D5854

## (undated) DEVICE — SLV SCD KN ADJ EXPRSS LG

## (undated) DEVICE — SUT GUT PLN 0 CT3 27IN N864H

## (undated) DEVICE — SUT MNCRYL 4/0 PS2 27IN UD MCP426H